# Patient Record
Sex: FEMALE | Race: WHITE | NOT HISPANIC OR LATINO | URBAN - METROPOLITAN AREA
[De-identification: names, ages, dates, MRNs, and addresses within clinical notes are randomized per-mention and may not be internally consistent; named-entity substitution may affect disease eponyms.]

---

## 2021-04-15 ENCOUNTER — OUTPATIENT (OUTPATIENT)
Dept: OUTPATIENT SERVICES | Facility: HOSPITAL | Age: 51
LOS: 1 days | End: 2021-04-15
Payer: COMMERCIAL

## 2021-04-15 DIAGNOSIS — K22.2 ESOPHAGEAL OBSTRUCTION: ICD-10-CM

## 2021-04-15 DIAGNOSIS — Z78.9 OTHER SPECIFIED HEALTH STATUS: ICD-10-CM

## 2021-04-15 DIAGNOSIS — R13.10 DYSPHAGIA, UNSPECIFIED: ICD-10-CM

## 2021-04-15 PROCEDURE — 74220 X-RAY XM ESOPHAGUS 1CNTRST: CPT | Mod: 26

## 2021-04-15 PROCEDURE — 74220 X-RAY XM ESOPHAGUS 1CNTRST: CPT

## 2021-07-23 ENCOUNTER — INPATIENT (INPATIENT)
Facility: HOSPITAL | Age: 51
LOS: 3 days | Discharge: HOME | DRG: 554 | End: 2021-07-27
Attending: HOSPITALIST | Admitting: INTERNAL MEDICINE
Payer: MEDICARE

## 2021-07-23 VITALS
OXYGEN SATURATION: 98 % | RESPIRATION RATE: 18 BRPM | TEMPERATURE: 99 F | HEART RATE: 97 BPM | DIASTOLIC BLOOD PRESSURE: 90 MMHG | SYSTOLIC BLOOD PRESSURE: 151 MMHG | WEIGHT: 210.1 LBS | HEIGHT: 66 IN

## 2021-07-23 DIAGNOSIS — Z96.653 PRESENCE OF ARTIFICIAL KNEE JOINT, BILATERAL: Chronic | ICD-10-CM

## 2021-07-23 DIAGNOSIS — M25.461 EFFUSION, RIGHT KNEE: ICD-10-CM

## 2021-07-23 DIAGNOSIS — Z90.711 ACQUIRED ABSENCE OF UTERUS WITH REMAINING CERVICAL STUMP: Chronic | ICD-10-CM

## 2021-07-23 DIAGNOSIS — Z98.891 HISTORY OF UTERINE SCAR FROM PREVIOUS SURGERY: Chronic | ICD-10-CM

## 2021-07-23 DIAGNOSIS — M25.561 PAIN IN RIGHT KNEE: ICD-10-CM

## 2021-07-23 LAB
ALBUMIN SERPL ELPH-MCNC: 3.7 G/DL — SIGNIFICANT CHANGE UP (ref 3.3–5)
ALP SERPL-CCNC: 100 U/L — SIGNIFICANT CHANGE UP (ref 40–120)
ALT FLD-CCNC: 20 U/L — SIGNIFICANT CHANGE UP (ref 10–45)
ANION GAP SERPL CALC-SCNC: 9 MMOL/L — SIGNIFICANT CHANGE UP (ref 5–17)
AST SERPL-CCNC: 22 U/L — SIGNIFICANT CHANGE UP (ref 10–40)
BASOPHILS # BLD AUTO: 0.1 K/UL — SIGNIFICANT CHANGE UP (ref 0–0.2)
BASOPHILS NFR BLD AUTO: 0.8 % — SIGNIFICANT CHANGE UP (ref 0–2)
BILIRUB SERPL-MCNC: 0.5 MG/DL — SIGNIFICANT CHANGE UP (ref 0.2–1.2)
BUN SERPL-MCNC: 15 MG/DL — SIGNIFICANT CHANGE UP (ref 7–23)
CALCIUM SERPL-MCNC: 9 MG/DL — SIGNIFICANT CHANGE UP (ref 8.4–10.5)
CHLORIDE SERPL-SCNC: 101 MMOL/L — SIGNIFICANT CHANGE UP (ref 96–108)
CK SERPL-CCNC: 91 U/L — SIGNIFICANT CHANGE UP (ref 25–170)
CO2 SERPL-SCNC: 26 MMOL/L — SIGNIFICANT CHANGE UP (ref 22–31)
CREAT SERPL-MCNC: 1.02 MG/DL — SIGNIFICANT CHANGE UP (ref 0.5–1.3)
EOSINOPHIL # BLD AUTO: 0.14 K/UL — SIGNIFICANT CHANGE UP (ref 0–0.5)
EOSINOPHIL NFR BLD AUTO: 1.1 % — SIGNIFICANT CHANGE UP (ref 0–6)
ERYTHROCYTE [SEDIMENTATION RATE] IN BLOOD: 15 MM/HR — SIGNIFICANT CHANGE UP (ref 0–20)
FLUAV AG NPH QL: SIGNIFICANT CHANGE UP
FLUBV AG NPH QL: SIGNIFICANT CHANGE UP
GLUCOSE SERPL-MCNC: 128 MG/DL — HIGH (ref 70–99)
HCT VFR BLD CALC: 42.8 % — SIGNIFICANT CHANGE UP (ref 34.5–45)
HGB BLD-MCNC: 15.2 G/DL — SIGNIFICANT CHANGE UP (ref 11.5–15.5)
IMM GRANULOCYTES NFR BLD AUTO: 0.4 % — SIGNIFICANT CHANGE UP (ref 0–1.5)
LYMPHOCYTES # BLD AUTO: 1.04 K/UL — SIGNIFICANT CHANGE UP (ref 1–3.3)
LYMPHOCYTES # BLD AUTO: 7.8 % — LOW (ref 13–44)
MCHC RBC-ENTMCNC: 30.8 PG — SIGNIFICANT CHANGE UP (ref 27–34)
MCHC RBC-ENTMCNC: 35.5 GM/DL — SIGNIFICANT CHANGE UP (ref 32–36)
MCV RBC AUTO: 86.8 FL — SIGNIFICANT CHANGE UP (ref 80–100)
MONOCYTES # BLD AUTO: 0.39 K/UL — SIGNIFICANT CHANGE UP (ref 0–0.9)
MONOCYTES NFR BLD AUTO: 2.9 % — SIGNIFICANT CHANGE UP (ref 2–14)
NEUTROPHILS # BLD AUTO: 11.56 K/UL — HIGH (ref 1.8–7.4)
NEUTROPHILS NFR BLD AUTO: 87 % — HIGH (ref 43–77)
NRBC # BLD: 0 /100 WBCS — SIGNIFICANT CHANGE UP (ref 0–0)
PLATELET # BLD AUTO: 282 K/UL — SIGNIFICANT CHANGE UP (ref 150–400)
POTASSIUM SERPL-MCNC: 3.4 MMOL/L — LOW (ref 3.5–5.3)
POTASSIUM SERPL-SCNC: 3.4 MMOL/L — LOW (ref 3.5–5.3)
PROT SERPL-MCNC: 7.2 G/DL — SIGNIFICANT CHANGE UP (ref 6–8.3)
RBC # BLD: 4.93 M/UL — SIGNIFICANT CHANGE UP (ref 3.8–5.2)
RBC # FLD: 12.1 % — SIGNIFICANT CHANGE UP (ref 10.3–14.5)
SARS-COV-2 RNA SPEC QL NAA+PROBE: SIGNIFICANT CHANGE UP
SODIUM SERPL-SCNC: 136 MMOL/L — SIGNIFICANT CHANGE UP (ref 135–145)
URATE SERPL-MCNC: 4.5 MG/DL — SIGNIFICANT CHANGE UP (ref 2.5–7)
WBC # BLD: 13.28 K/UL — HIGH (ref 3.8–10.5)
WBC # FLD AUTO: 13.28 K/UL — HIGH (ref 3.8–10.5)

## 2021-07-23 PROCEDURE — 99222 1ST HOSP IP/OBS MODERATE 55: CPT

## 2021-07-23 PROCEDURE — 20610 DRAIN/INJ JOINT/BURSA W/O US: CPT | Mod: RT

## 2021-07-23 PROCEDURE — 99285 EMERGENCY DEPT VISIT HI MDM: CPT

## 2021-07-23 PROCEDURE — 93970 EXTREMITY STUDY: CPT | Mod: 26

## 2021-07-23 PROCEDURE — 73562 X-RAY EXAM OF KNEE 3: CPT | Mod: 26,50

## 2021-07-23 PROCEDURE — 99222 1ST HOSP IP/OBS MODERATE 55: CPT | Mod: 25

## 2021-07-23 RX ORDER — POTASSIUM CHLORIDE 20 MEQ
20 PACKET (EA) ORAL
Refills: 0 | Status: COMPLETED | OUTPATIENT
Start: 2021-07-23 | End: 2021-07-23

## 2021-07-23 RX ORDER — OXYCODONE HYDROCHLORIDE 5 MG/1
5 TABLET ORAL EVERY 6 HOURS
Refills: 0 | Status: DISCONTINUED | OUTPATIENT
Start: 2021-07-23 | End: 2021-07-23

## 2021-07-23 RX ORDER — SENNA PLUS 8.6 MG/1
2 TABLET ORAL AT BEDTIME
Refills: 0 | Status: DISCONTINUED | OUTPATIENT
Start: 2021-07-23 | End: 2021-07-27

## 2021-07-23 RX ORDER — CEFUROXIME AXETIL 250 MG
500 TABLET ORAL EVERY 12 HOURS
Refills: 0 | Status: DISCONTINUED | OUTPATIENT
Start: 2021-07-23 | End: 2021-07-24

## 2021-07-23 RX ORDER — PANTOPRAZOLE SODIUM 20 MG/1
1 TABLET, DELAYED RELEASE ORAL
Qty: 0 | Refills: 0 | DISCHARGE

## 2021-07-23 RX ORDER — TRAMADOL HYDROCHLORIDE 50 MG/1
50 TABLET ORAL EVERY 6 HOURS
Refills: 0 | Status: DISCONTINUED | OUTPATIENT
Start: 2021-07-23 | End: 2021-07-27

## 2021-07-23 RX ORDER — OFLOXACIN 0.3 %
10 DROPS OPHTHALMIC (EYE) DAILY
Refills: 0 | Status: DISCONTINUED | OUTPATIENT
Start: 2021-07-23 | End: 2021-07-26

## 2021-07-23 RX ORDER — ATENOLOL 25 MG/1
1 TABLET ORAL
Qty: 0 | Refills: 0 | DISCHARGE

## 2021-07-23 RX ORDER — KETOROLAC TROMETHAMINE 30 MG/ML
15 SYRINGE (ML) INJECTION ONCE
Refills: 0 | Status: DISCONTINUED | OUTPATIENT
Start: 2021-07-23 | End: 2021-07-23

## 2021-07-23 RX ORDER — ENOXAPARIN SODIUM 100 MG/ML
40 INJECTION SUBCUTANEOUS DAILY
Refills: 0 | Status: DISCONTINUED | OUTPATIENT
Start: 2021-07-23 | End: 2021-07-27

## 2021-07-23 RX ORDER — AMLODIPINE BESYLATE 2.5 MG/1
1 TABLET ORAL
Qty: 0 | Refills: 0 | DISCHARGE

## 2021-07-23 RX ORDER — OXYCODONE AND ACETAMINOPHEN 5; 325 MG/1; MG/1
1 TABLET ORAL ONCE
Refills: 0 | Status: DISCONTINUED | OUTPATIENT
Start: 2021-07-23 | End: 2021-07-23

## 2021-07-23 RX ORDER — KETOROLAC TROMETHAMINE 30 MG/ML
30 SYRINGE (ML) INJECTION THREE TIMES A DAY
Refills: 0 | Status: DISCONTINUED | OUTPATIENT
Start: 2021-07-23 | End: 2021-07-25

## 2021-07-23 RX ORDER — AMLODIPINE BESYLATE 2.5 MG/1
10 TABLET ORAL DAILY
Refills: 0 | Status: DISCONTINUED | OUTPATIENT
Start: 2021-07-23 | End: 2021-07-27

## 2021-07-23 RX ORDER — HYDROCHLOROTHIAZIDE 25 MG
25 TABLET ORAL DAILY
Refills: 0 | Status: DISCONTINUED | OUTPATIENT
Start: 2021-07-23 | End: 2021-07-27

## 2021-07-23 RX ORDER — ATORVASTATIN CALCIUM 80 MG/1
1 TABLET, FILM COATED ORAL
Qty: 0 | Refills: 0 | DISCHARGE

## 2021-07-23 RX ORDER — PANTOPRAZOLE SODIUM 20 MG/1
40 TABLET, DELAYED RELEASE ORAL
Refills: 0 | Status: DISCONTINUED | OUTPATIENT
Start: 2021-07-23 | End: 2021-07-27

## 2021-07-23 RX ORDER — ATENOLOL 25 MG/1
50 TABLET ORAL DAILY
Refills: 0 | Status: DISCONTINUED | OUTPATIENT
Start: 2021-07-23 | End: 2021-07-27

## 2021-07-23 RX ORDER — ACETAMINOPHEN 500 MG
650 TABLET ORAL EVERY 6 HOURS
Refills: 0 | Status: DISCONTINUED | OUTPATIENT
Start: 2021-07-23 | End: 2021-07-27

## 2021-07-23 RX ORDER — CLARITHROMYCIN 500 MG
500 TABLET ORAL
Refills: 0 | Status: DISCONTINUED | OUTPATIENT
Start: 2021-07-23 | End: 2021-07-23

## 2021-07-23 RX ORDER — DEXAMETHASONE 0.5 MG/5ML
10 ELIXIR ORAL ONCE
Refills: 0 | Status: COMPLETED | OUTPATIENT
Start: 2021-07-23 | End: 2021-07-23

## 2021-07-23 RX ORDER — OXYCODONE AND ACETAMINOPHEN 5; 325 MG/1; MG/1
1 TABLET ORAL EVERY 6 HOURS
Refills: 0 | Status: DISCONTINUED | OUTPATIENT
Start: 2021-07-23 | End: 2021-07-27

## 2021-07-23 RX ORDER — DIAZEPAM 5 MG
5 TABLET ORAL ONCE
Refills: 0 | Status: DISCONTINUED | OUTPATIENT
Start: 2021-07-23 | End: 2021-07-23

## 2021-07-23 RX ADMIN — Medication 30 MILLIGRAM(S): at 12:45

## 2021-07-23 RX ADMIN — Medication 20 MILLIEQUIVALENT(S): at 15:28

## 2021-07-23 RX ADMIN — Medication 25 MILLIGRAM(S): at 06:41

## 2021-07-23 RX ADMIN — OXYCODONE HYDROCHLORIDE 5 MILLIGRAM(S): 5 TABLET ORAL at 08:32

## 2021-07-23 RX ADMIN — OXYCODONE AND ACETAMINOPHEN 1 TABLET(S): 5; 325 TABLET ORAL at 18:08

## 2021-07-23 RX ADMIN — AMLODIPINE BESYLATE 10 MILLIGRAM(S): 2.5 TABLET ORAL at 06:41

## 2021-07-23 RX ADMIN — Medication 10 DROP(S): at 12:34

## 2021-07-23 RX ADMIN — Medication 5 MILLIGRAM(S): at 02:51

## 2021-07-23 RX ADMIN — OXYCODONE AND ACETAMINOPHEN 1 TABLET(S): 5; 325 TABLET ORAL at 18:51

## 2021-07-23 RX ADMIN — Medication 500 MILLIGRAM(S): at 12:31

## 2021-07-23 RX ADMIN — Medication 16 MILLIGRAM(S): at 06:41

## 2021-07-23 RX ADMIN — Medication 10 MILLIGRAM(S): at 05:00

## 2021-07-23 RX ADMIN — Medication 15 MILLIGRAM(S): at 02:51

## 2021-07-23 RX ADMIN — Medication 30 MILLIGRAM(S): at 21:35

## 2021-07-23 RX ADMIN — PANTOPRAZOLE SODIUM 40 MILLIGRAM(S): 20 TABLET, DELAYED RELEASE ORAL at 06:41

## 2021-07-23 RX ADMIN — Medication 20 MILLIEQUIVALENT(S): at 12:32

## 2021-07-23 RX ADMIN — ENOXAPARIN SODIUM 40 MILLIGRAM(S): 100 INJECTION SUBCUTANEOUS at 12:32

## 2021-07-23 RX ADMIN — Medication 15 MILLIGRAM(S): at 03:21

## 2021-07-23 RX ADMIN — OXYCODONE AND ACETAMINOPHEN 1 TABLET(S): 5; 325 TABLET ORAL at 04:06

## 2021-07-23 RX ADMIN — Medication 102 MILLIGRAM(S): at 04:28

## 2021-07-23 RX ADMIN — TRAMADOL HYDROCHLORIDE 50 MILLIGRAM(S): 50 TABLET ORAL at 18:00

## 2021-07-23 RX ADMIN — SENNA PLUS 2 TABLET(S): 8.6 TABLET ORAL at 20:35

## 2021-07-23 RX ADMIN — ATENOLOL 50 MILLIGRAM(S): 25 TABLET ORAL at 06:40

## 2021-07-23 RX ADMIN — OXYCODONE AND ACETAMINOPHEN 1 TABLET(S): 5; 325 TABLET ORAL at 03:36

## 2021-07-23 RX ADMIN — Medication 500 MILLIGRAM(S): at 17:18

## 2021-07-23 RX ADMIN — PANTOPRAZOLE SODIUM 40 MILLIGRAM(S): 20 TABLET, DELAYED RELEASE ORAL at 18:04

## 2021-07-23 RX ADMIN — Medication 16 MILLIGRAM(S): at 18:04

## 2021-07-23 RX ADMIN — TRAMADOL HYDROCHLORIDE 50 MILLIGRAM(S): 50 TABLET ORAL at 17:16

## 2021-07-23 RX ADMIN — Medication 30 MILLIGRAM(S): at 12:35

## 2021-07-23 RX ADMIN — OXYCODONE HYDROCHLORIDE 5 MILLIGRAM(S): 5 TABLET ORAL at 09:30

## 2021-07-23 RX ADMIN — Medication 30 MILLIGRAM(S): at 20:35

## 2021-07-23 NOTE — ED PROVIDER NOTE - NS ED ROS FT
ROS:  GEN: (-) fevers/chills, (-) weight loss, (-) fatigue/malaise  HEENT: (-) visual change  NECK: (-) stiffness, (-) swelling  RESP: (-) shortness of breath, (-) cough, (-) sputum  CV: (-) chest pain, (-) palpitations, (-) PND/orthopnea  GI: (-) nausea, (-) vomiting, (-) pain, (-) constipation, (-) diarrhea  : (-) frequency/urgency, (-) hematuria, (-) dysuria, (-) incontinence, (-) discharge  EXT: (-) edema, (-) cyanosis  ENDO: (-) heat/cold intolerance, (-) polyuria  NEURO: (-) paresthesias, (-) weakness, (-) headache, (-) dizziness, (-) syncope  MSK: (+) knee pain  SKIN: (-) rash

## 2021-07-23 NOTE — H&P ADULT - NSICDXFAMILYHX_GEN_ALL_CORE_FT
FAMILY HISTORY:  Mother  Still living? Unknown  FH: thyroid disease, Age at diagnosis: Age Unknown    Sibling  Still living? Unknown  FH: thyroid disease, Age at diagnosis: Age Unknown

## 2021-07-23 NOTE — H&P ADULT - NSHPPHYSICALEXAM_GEN_ALL_CORE
Vital Signs Last 24 Hrs  T(C): 37 (23 Jul 2021 06:26), Max: 37.4 (23 Jul 2021 02:37)  T(F): 98.6 (23 Jul 2021 06:26), Max: 99.3 (23 Jul 2021 02:37)  HR: 68 (23 Jul 2021 06:26) (68 - 97)  BP: 114/75 (23 Jul 2021 06:26) (114/75 - 151/90)  BP(mean): --  RR: 18 (23 Jul 2021 06:26) (18 - 18)  SpO2: 95% (23 Jul 2021 06:26) (95% - 98%)  Daily Height in cm: 167.64 (23 Jul 2021 02:37)    Daily   CAPILLARY BLOOD GLUCOSE        I&O's Summary      GENERAL: NAD  HEAD:  Normocephalic  EYES: EOMI, PERRLA, conjunctiva and sclera clear  ENMT: No tonsillar erythema, exudates, or enlargement; Moist mucous membranes, No lesions  NECK: Supple, No JVD, no bruit, normal thyroid  NERVOUS SYSTEM:  Alert & Oriented X3, grossly moves all fours.   CHEST/LUNG: Clear to auscultation bilaterally; No rales, rhonchi, wheezing, or rubs  HEART: Regular rate and rhythm; +systolic murmurs, no rubs, or gallops  ABDOMEN: Soft, Nontender, Nondistended; Bowel sounds present  EXTREMITIES:  2+ Peripheral Pulses, No clubbing, cyanosis, or edema. bl TKR with bl knee effusions R>>L with bl posterior knee fullness R >>L painful with slight warmth but no erythema.   LYMPH: No lymphadenopathy noted  SKIN: No rashes or lesions

## 2021-07-23 NOTE — CONSULT NOTE ADULT - SUBJECTIVE AND OBJECTIVE BOX
GEOVANNY KIM      51y Female with hx of HTN, HLD, s/p b/l TKR  p/w L ear pain of about 2 days duration and had seen Urgent care 2 days ago and prescribed Biaxin 500mg bid and had 3 doses and ofloxacin ear drops was added yesterday and later developed acute onset of b/l knee pain with swelling and unable to bend the knee or bear weight secondary to significant pain and swelling. Denied any other peripheral joint involvement or axial sxs. Denied trauma. Denied any fevers, chills, cough, recent diarrheal illness, oral ulcers, eye inflammation, dysuria. No hx of crystal arthropathies, uric acid nephrolithiasis, autoimmune arthritis, psoriasis.                                                                                                                               PAST MEDICAL HISTORY:  Gastroesophageal reflux disease     Hyperlipidemia     Hypertension.     PAST SURGICAL HISTORY:  H/O:  x4    History of partial hysterectomy     History of total knee replacement, bilateral in .         T(F): 97.8  HR: 62  BP: 122/63  RR: 18  SpO2: 96%                        15.2   13.28 )-----------( 282      ( 2021 02:39 )             42.8                     07-23    136  |  101  |  15  ----------------------------<  128<H>  3.4<L>   |  26  |  1.02    Ca    9.0      2021 02:39    TPro  7.2  /  Alb  3.7  /  TBili  0.5  /  DBili  x   /  AST  22  /  ALT  20  /  AlkPhos  100  07-    ESR 15    Serum Uric Acid 4.5      Physical Exam :    -   Right knee with skin C/D/I, well healed surgical scar, 2-3+ effusion, marked tenderness to palpation, no erythema, no warmth to touch, ROM 0-50 degrees with pain, no instability.   -   Distal Neurvascular status intact grossly.   -   Warm well perfused; capillary refill <3 seconds   -   (+)EHL/FHL 5/5  -   (+) Sensation to light touch  -   Left knee with skin C/D/I, well healed surgical scar, 1-2+ effusion, moderate tenderness to palpation, no erythema, no warmth to touch, ROM 0-100 degrees with mild pain, no instability.   -   Distal Neurvascular status intact grossly.   -   Warm well perfused; capillary refill <3 seconds   -   (+)EHL/FHL 5/5  -   (+) Sensation to light touch  -   (-) Calf tenderness Bilaterally      AP and lateral x-rays of B/L knees show s/p TKR with prosthesis in good alignment, no evidence of loosening or acute fracture.      EXAM:  US DPLX LWR EXT VEINS COMPL BI      PROCEDURE DATE:  2021        INTERPRETATION:  CLINICAL INFORMATION: Bilateral leg and knee pain. Evaluate for DVT. History of bilateral knee replacements.    COMPARISON: None available.    TECHNIQUE: Duplex sonography of the BILATERAL LOWER extremity veins with color and spectral Doppler, with and without compression.    FINDINGS:    RIGHT:  Normal compressibility of the RIGHT common femoral, femoral and popliteal veins.  Doppler examination shows normal spontaneous and phasic flow.  No RIGHT calf vein thrombosis is detected.  5.0 x 3.5 x 2.3 cm as well as a 3.8 x 3.0 x 2.0 cm complex fluid collections are identified within the right popliteal fossa, possible Baker's cyst versus postsurgical change.  Right suprapatellar joint effusion identified.    LEFT:  Normal compressibility of the LEFT common femoral, femoral and popliteal veins.  Doppler examination shows normal spontaneous and phasic flow.  No LEFT calf vein thrombosis is detected.  A 3.9 x 2.8 x 2.4 cm complicated fluid collection is identified within the left popliteal fossa, possible Baker's cyst versus postsurgical change.  Left suprapatellar joint effusion is identified.    IMPRESSION:  No evidence of deep venous thrombosis in either lower extremity. See above discussion.          --- End of Report ---              DANIELITO WOMACK MD; Attending Radiologist  This document has been electronically signed. 2021  3:53PM

## 2021-07-23 NOTE — H&P ADULT - NSHPLABSRESULTS_GEN_ALL_CORE
15.2   13.28 )-----------( 282      ( 23 Jul 2021 02:39 )             42.8       07-23    136  |  101  |  15  ----------------------------<  128<H>  3.4<L>   |  26  |  1.02    Ca    9.0      23 Jul 2021 02:39    TPro  7.2  /  Alb  3.7  /  TBili  0.5  /  DBili  x   /  AST  22  /  ALT  20  /  AlkPhos  100  07-23         LIVER FUNCTIONS - ( 23 Jul 2021 02:39 )  Alb: 3.7 g/dL / Pro: 7.2 g/dL / ALK PHOS: 100 U/L / ALT: 20 U/L / AST: 22 U/L / GGT: x                   CARDIAC MARKERS ( 23 Jul 2021 04:30 )  x     / x     / 91 U/L / x     / x                CAPILLARY BLOOD GLUCOSE            EKG: PENDING      bl knee Xrays: wet read. personally reviewed. + effusions bl

## 2021-07-23 NOTE — ED PROVIDER NOTE - PHYSICAL EXAMINATION
PHYSICAL EXAMINATION:  VITALS REVIEWED.  VS hypertensive, otherwise normal  GENERALIZED APPEARANCE:  Uncomfortable appearing female lying in bed  SKIN:  Warm, dry, no cyanosis  HEAD:  No obvious scalp lesions  EYES:  Conjunctiva pink, no icterus  ENMT:  Mucus membranes moist, no stridor  NECK:  Supple, non-tender  CHEST AND RESPIRATORY:  Clear to auscultation B/L, good air entry B/L, equal chest expansion  HEART AND CARDIOVASCULAR:  Regular rate, no obvious murmur  ABDOMEN AND GI:  Soft, non-tender, non-distended.  No rebound, no guarding, no CVA-area tenderness  EXTREMITIES:  No deformity, edema, or calf tenderness; bilateral knees with well healed old scars; ROM of knees actively limited 2/2 pain, passively able to flex/extend, negative valgus/varus tests; DP/PT pulses 2+ B/L, SILT, compartments soft, ROM of ankles/hips intact, pop pulse 2+  NEURO: AAOx3, gross motor and sensory intact PHYSICAL EXAMINATION:  VITALS REVIEWED.  VS hypertensive, otherwise normal  GENERALIZED APPEARANCE:  Uncomfortable appearing female lying in bed  SKIN:  Warm, dry, no cyanosis  HEAD:  No obvious scalp lesions  EYES:  Conjunctiva pink, no icterus  ENMT:  Mucus membranes moist, no stridor  NECK:  Supple, non-tender  CHEST AND RESPIRATORY:  Clear to auscultation B/L, good air entry B/L, equal chest expansion  HEART AND CARDIOVASCULAR:  Regular rate, no obvious murmur  ABDOMEN AND GI:  Soft, non-tender, non-distended.  No rebound, no guarding, no CVA-area tenderness  EXTREMITIES:  No deformity, edema, or calf tenderness; no venous tenderness, bilateral knees with well healed old scars; ROM of knees actively limited 2/2 pain, passively able to flex/extend, negative valgus/varus tests; DP/PT pulses 2+ B/L, SILT, compartments soft, ROM of ankles/hips intact, pop pulse 2+  NEURO: AAOx3, gross motor and sensory intact

## 2021-07-23 NOTE — H&P ADULT - ASSESSMENT
51F hx of HTN, HLD, s/p bl TKR  pw L ear pain on biaxin with acute onset of bl knee pain.     # Bl knee pain with effusions in bl replaced knees.   r/o crystal arthropathies, check uric acid, TSH, PTH. less likely reactive arthritis/autoimmune arthritis.  trial of steroids.   ortho consult   check dopplers to be complete.     #HTN  cont amlodipine, atenolol.  cont hctz for now.     #DVT/GI proph    CAPRINI SCORE [CLOT]    AGE RELATED RISK FACTORS                                                       MOBILITY RELATED FACTORS  [1 ] Age 41-60 years                                            (1 Point)                  [1 ] Bed rest                                                        (1 Point)  [ ] Age: 61-74 years                                           (2 Points)                 [ ] Plaster cast                                                   (2 Points)  [ ] Age= 75 years                                              (3 Points)                 [ ] Bed bound for more than 72 hours                 (2 Points)    DISEASE RELATED RISK FACTORS                                               GENDER SPECIFIC FACTORS  [ ] Edema in the lower extremities                       (1 Point)                  [ ] Pregnancy                                                     (1 Point)  [ ] Varicose veins                                               (1 Point)                  [ ] Post-partum < 6 weeks                                   (1 Point)             [1 ] BMI > 25 Kg/m2                                            (1 Point)                  [ ] Hormonal therapy  or oral contraception          (1 Point)                 [ ] Sepsis (in the previous month)                        (1 Point)                  [ ] History of pregnancy complications                 (1 point)  [ ] Pneumonia or serious lung disease                                               [ ] Unexplained or recurrent                     (1 Point)           (in the previous month)                               (1 Point)  [ ] Abnormal pulmonary function test                     (1 Point)                 SURGERY RELATED RISK FACTORS  [ ] Acute myocardial infarction                              (1 Point)                 [ ]  Section                                             (1 Point)  [ ] Congestive heart failure (in the previous month)  (1 Point)               [ ] Minor surgery                                                  (1 Point)   [ ] Inflammatory bowel disease                             (1 Point)                 [ ] Arthroscopic surgery                                        (2 Points)  [ ] Central venous access                                      (2 Points)                [ ] General surgery lasting more than 45 minutes   (2 Points)       [ ] Stroke (in the previous month)                          (5 Points)               [ ] Elective arthroplasty                                         (5 Points)                                                                                                                                               HEMATOLOGY RELATED FACTORS                                                 TRAUMA RELATED RISK FACTORS  [ ] Prior episodes of VTE                                     (3 Points)                [ ] Fracture of the hip, pelvis, or leg                       (5 Points)  [ ] Positive family history for VTE                         (3 Points)                 [ ] Acute spinal cord injury (in the previous month)  (5 Points)  [ ] Prothrombin 32765 A                                     (3 Points)                 [ ] Paralysis  (less than 1 month)                             (5 Points)  [ ] Factor V Leiden                                             (3 Points)                  [ ] Multiple Trauma within 1 month                        (5 Points)  [ ] Lupus anticoagulants                                     (3 Points)                                                           [ ] Anticardiolipin antibodies                               (3 Points)                                                       [ ] High homocysteine in the blood                      (3 Points)                                             [ ] Other congenital or acquired thrombophilia      (3 Points)                                                [ ] Heparin induced thrombocytopenia                  (3 Points)                                          Total Score [     3     ]    Caprini Score 0 - 2:  Low Risk, No VTE Prophylaxis required for most patients, encourage ambulation  Caprini Score 3 - 6:  At Risk, pharmacologic VTE prophylaxis is indicated for most patients (in the absence of a contraindication)  Caprini Score Greater than or = 7:  High Risk, pharmacologic VTE prophylaxis is indicated for most patients (in the absence of a contraindication)

## 2021-07-23 NOTE — ED ADULT TRIAGE NOTE - CHIEF COMPLAINT QUOTE
c/o bilateral leg and knee pain that has not let her sleep tonight. took 800mg motrin earlier with no relief.

## 2021-07-23 NOTE — H&P ADULT - NSHPREVIEWOFSYSTEMS_GEN_ALL_CORE
CONSTITUTIONAL: No fever, weight loss, or fatigue  EYES: No eye pain, visual disturbances, or discharge  ENMT:  No difficulty hearing, tinnitus, vertigo; No sinus or throat pain  NECK: No pain or stiffness  RESPIRATORY: No cough, wheezing, chills or hemoptysis; No shortness of breath  CARDIOVASCULAR: No chest pain, palpitations, dizziness, or leg swelling  GASTROINTESTINAL: No abdominal or epigastric pain. No nausea, vomiting, or hematemesis; No diarrhea or constipation. No melena or hematochezia.  GENITOURINARY: No dysuria, frequency, hematuria, or incontinence  NEUROLOGICAL: No headaches, memory loss, loss of strength, numbness, or tremors  SKIN: No itching, burning, rashes, or lesions   LYMPH NODES: No enlarged glands  ENDOCRINE: No heat or cold intolerance; No hair loss  MUSCULOSKELETAL: No joint pain or swelling; No muscle, back, or extremity pain. ++bl knee pain as per HPI  PSYCHIATRIC: No depression, anxiety, mood swings, or difficulty sleeping  HEME/LYMPH: No easy bruising, or bleeding gums  ALLERY AND IMMUNOLOGIC: No hives or eczema

## 2021-07-23 NOTE — ED PROVIDER NOTE - CLINICAL SUMMARY MEDICAL DECISION MAKING FREE TEXT BOX
51F w/ vague non-specific bilateral knee pain, sharp, ?MSK pain; no acute red flags, VS reassuring, will treat symptomatically, monitor, re-eval

## 2021-07-23 NOTE — PATIENT PROFILE ADULT - VISION (WITH CORRECTIVE LENSES IF THE PATIENT USUALLY WEARS THEM):
patient has reading glasses/Normal vision: sees adequately in most situations; can see medication labels, newsprint

## 2021-07-23 NOTE — H&P ADULT - HISTORY OF PRESENT ILLNESS
51F hx of HTN, HLD, s/p bl TKR 2006 pw L ear pain of about 2 days duration and had seen Urgent care 2 days ago and prescribed Biaxin 500mg bid and had 3 doses and ofloxacin ear drops was added yesterday and later developed acute onset of bl knee pain with swelling and unable to bend the knee sec to significant pain and swelling. Denied any other peripheral joint involvement or axial sxs. Denied trauma. Denied any fevers, chills, cough, recent diarrheal illness, oral ulcers, eye inflammation, dysuria. No hx of crystal arthropathies, uric acid nephrolithiasis, autoimmune arthritis, psoriasis. In ED afebrile P: 97 BP: 151/90 sat 95-98% on RA. WBC: 13.3 K: 3.4 ESR Pending. s/p Decadron, ketorolac, Percocet in ED with still sig pain and unable to ambulate.

## 2021-07-23 NOTE — ED PROVIDER NOTE - CARE PLAN
Principal Discharge DX:	Bilateral knee pain   Principal Discharge DX:	Bilateral knee pain  Secondary Diagnosis:	Unable to ambulate

## 2021-07-23 NOTE — H&P ADULT - NSICDXPASTSURGICALHX_GEN_ALL_CORE_FT
PAST SURGICAL HISTORY:  H/O:  x4    History of partial hysterectomy     History of total knee replacement, bilateral

## 2021-07-23 NOTE — ED PROVIDER NOTE - PROGRESS NOTE DETAILS
Patient continues to report moderate pain of bilateral knees, was able to use the commode with assistance, ?unknown etiology of bilateral knee pain but symmetrical, likely MSK. Will attempt steroids, continue to monitor. Patient continues to report moderate pain of bilateral knees, was able to use the commode with assistance, ?unknown etiology of bilateral knee pain but symmetrical, ?inflammatory arthritis, likely MSK. Will attempt steroids, continue to monitor.

## 2021-07-23 NOTE — ED PROVIDER NOTE - OBJECTIVE STATEMENT
51F w/ PMHx of HTN, HLD presenting to the ED complaining of bilateral knee pain, states that today she went to an urgent care where they diagnosed where with a left ear infection and gave her Clarithromycin and Ofloxacin for her ear infection, went home and started having moderate bilateral knee pain, unable to sleep or ambulate, states that she does not know why, took 800mg of Ibuprofen without relief, has not had pain like this before. Denies any numbness/tingling. Denies any falls/trauma. No recent immobilization or surgeries. Had bilateral knee replacements many years ago. 51F w/ PMHx of HTN, HLD presenting to the ED complaining of bilateral knee pain, states that today she went to an urgent care where they diagnosed where with a left ear infection and gave her Clarithromycin and Ofloxacin for her ear infection, went home and started having moderate bilateral knee pain, unable to sleep or ambulate, states that she does not know why, took 800mg of Ibuprofen without relief, has not had pain like this before. Denies any numbness/tingling. Denies any falls/trauma. No recent immobilization or surgeries. Had bilateral knee replacements many years ago. States memorial day patient had leg swelling bilaterally "blew up like balloons" went to an urgent care and was told it was dependent edema, that has resolved.

## 2021-07-24 LAB
ALBUMIN SERPL ELPH-MCNC: 3.5 G/DL — SIGNIFICANT CHANGE UP (ref 3.3–5)
ALP SERPL-CCNC: 92 U/L — SIGNIFICANT CHANGE UP (ref 40–120)
ALT FLD-CCNC: 21 U/L — SIGNIFICANT CHANGE UP (ref 10–45)
ANION GAP SERPL CALC-SCNC: 9 MMOL/L — SIGNIFICANT CHANGE UP (ref 5–17)
AST SERPL-CCNC: 12 U/L — SIGNIFICANT CHANGE UP (ref 10–40)
B PERT IGG+IGM PNL SER: ABNORMAL
BASOPHILS # BLD AUTO: 0.04 K/UL — SIGNIFICANT CHANGE UP (ref 0–0.2)
BASOPHILS NFR BLD AUTO: 0.2 % — SIGNIFICANT CHANGE UP (ref 0–2)
BILIRUB SERPL-MCNC: 0.3 MG/DL — SIGNIFICANT CHANGE UP (ref 0.2–1.2)
BUN SERPL-MCNC: 29 MG/DL — HIGH (ref 7–23)
CALCIUM SERPL-MCNC: 9.7 MG/DL — SIGNIFICANT CHANGE UP (ref 8.4–10.5)
CHLORIDE SERPL-SCNC: 100 MMOL/L — SIGNIFICANT CHANGE UP (ref 96–108)
CO2 SERPL-SCNC: 27 MMOL/L — SIGNIFICANT CHANGE UP (ref 22–31)
COLOR FLD: YELLOW — SIGNIFICANT CHANGE UP
COVID-19 SPIKE DOMAIN AB INTERP: POSITIVE
COVID-19 SPIKE DOMAIN ANTIBODY RESULT: >250 U/ML — HIGH
CREAT SERPL-MCNC: 1.02 MG/DL — SIGNIFICANT CHANGE UP (ref 0.5–1.3)
EOSINOPHIL # BLD AUTO: 0.12 K/UL — SIGNIFICANT CHANGE UP (ref 0–0.5)
EOSINOPHIL # FLD: 0 % — SIGNIFICANT CHANGE UP
EOSINOPHIL NFR BLD AUTO: 0.6 % — SIGNIFICANT CHANGE UP (ref 0–6)
FLUID INTAKE SUBSTANCE CLASS: SIGNIFICANT CHANGE UP
FLUID SEGMENTED GRANULOCYTES: 76 % — SIGNIFICANT CHANGE UP
FOLATE+VIT B12 SERBLD-IMP: 0 % — SIGNIFICANT CHANGE UP
GLUCOSE SERPL-MCNC: 148 MG/DL — HIGH (ref 70–99)
GRAM STN FLD: SIGNIFICANT CHANGE UP
HCT VFR BLD CALC: 42.2 % — SIGNIFICANT CHANGE UP (ref 34.5–45)
HGB BLD-MCNC: 14.8 G/DL — SIGNIFICANT CHANGE UP (ref 11.5–15.5)
IMM GRANULOCYTES NFR BLD AUTO: 0.8 % — SIGNIFICANT CHANGE UP (ref 0–1.5)
LYMPHOCYTES # BLD AUTO: 0.73 K/UL — LOW (ref 1–3.3)
LYMPHOCYTES # BLD AUTO: 3.6 % — LOW (ref 13–44)
LYMPHOCYTES # FLD: 2 % — SIGNIFICANT CHANGE UP
MCHC RBC-ENTMCNC: 30.3 PG — SIGNIFICANT CHANGE UP (ref 27–34)
MCHC RBC-ENTMCNC: 35.1 GM/DL — SIGNIFICANT CHANGE UP (ref 32–36)
MCV RBC AUTO: 86.5 FL — SIGNIFICANT CHANGE UP (ref 80–100)
MESOTHL CELL # FLD: 0 % — SIGNIFICANT CHANGE UP
MONOCYTES # BLD AUTO: 0.42 K/UL — SIGNIFICANT CHANGE UP (ref 0–0.9)
MONOCYTES NFR BLD AUTO: 2.1 % — SIGNIFICANT CHANGE UP (ref 2–14)
MONOS+MACROS # FLD: 22 % — SIGNIFICANT CHANGE UP
NEUTROPHILS # BLD AUTO: 18.95 K/UL — HIGH (ref 1.8–7.4)
NEUTROPHILS NFR BLD AUTO: 92.7 % — HIGH (ref 43–77)
NRBC # BLD: 0 /100 WBCS — SIGNIFICANT CHANGE UP (ref 0–0)
NRBC # FLD: 0 — SIGNIFICANT CHANGE UP
OTHER CELLS FLD MANUAL: 0 % — SIGNIFICANT CHANGE UP
PLATELET # BLD AUTO: 293 K/UL — SIGNIFICANT CHANGE UP (ref 150–400)
POTASSIUM SERPL-MCNC: 3.6 MMOL/L — SIGNIFICANT CHANGE UP (ref 3.5–5.3)
POTASSIUM SERPL-SCNC: 3.6 MMOL/L — SIGNIFICANT CHANGE UP (ref 3.5–5.3)
PROCALCITONIN SERPL-MCNC: 0.03 NG/ML — SIGNIFICANT CHANGE UP
PROT SERPL-MCNC: 7.5 G/DL — SIGNIFICANT CHANGE UP (ref 6–8.3)
RBC # BLD: 4.88 M/UL — SIGNIFICANT CHANGE UP (ref 3.8–5.2)
RBC # FLD: 12.2 % — SIGNIFICANT CHANGE UP (ref 10.3–14.5)
RCV VOL RI: HIGH /UL (ref 0–0)
SARS-COV-2 IGG+IGM SERPL QL IA: >250 U/ML — HIGH
SARS-COV-2 IGG+IGM SERPL QL IA: POSITIVE
SODIUM SERPL-SCNC: 136 MMOL/L — SIGNIFICANT CHANGE UP (ref 135–145)
SPECIMEN SOURCE: SIGNIFICANT CHANGE UP
SYNOVIAL CRYSTALS CLARITY: ABNORMAL
SYNOVIAL CRYSTALS COLOR: YELLOW
SYNOVIAL CRYSTALS ID: SIGNIFICANT CHANGE UP
SYNOVIAL CRYSTALS TUBE: SIGNIFICANT CHANGE UP
TOTAL NUCLEATED CELL COUNT, BODY FLUID: SIGNIFICANT CHANGE UP /UL
TUBE TYPE: SIGNIFICANT CHANGE UP
WBC # BLD: 20.43 K/UL — HIGH (ref 3.8–10.5)
WBC # FLD AUTO: 20.43 K/UL — HIGH (ref 3.8–10.5)

## 2021-07-24 PROCEDURE — 99232 SBSQ HOSP IP/OBS MODERATE 35: CPT

## 2021-07-24 RX ADMIN — Medication 16 MILLIGRAM(S): at 05:30

## 2021-07-24 RX ADMIN — ENOXAPARIN SODIUM 40 MILLIGRAM(S): 100 INJECTION SUBCUTANEOUS at 11:24

## 2021-07-24 RX ADMIN — Medication 25 MILLIGRAM(S): at 05:55

## 2021-07-24 RX ADMIN — ATENOLOL 50 MILLIGRAM(S): 25 TABLET ORAL at 05:30

## 2021-07-24 RX ADMIN — OXYCODONE AND ACETAMINOPHEN 1 TABLET(S): 5; 325 TABLET ORAL at 15:45

## 2021-07-24 RX ADMIN — Medication 500 MILLIGRAM(S): at 05:55

## 2021-07-24 RX ADMIN — PANTOPRAZOLE SODIUM 40 MILLIGRAM(S): 20 TABLET, DELAYED RELEASE ORAL at 05:30

## 2021-07-24 RX ADMIN — OXYCODONE AND ACETAMINOPHEN 1 TABLET(S): 5; 325 TABLET ORAL at 06:42

## 2021-07-24 RX ADMIN — Medication 30 MILLIGRAM(S): at 06:11

## 2021-07-24 RX ADMIN — AMLODIPINE BESYLATE 10 MILLIGRAM(S): 2.5 TABLET ORAL at 05:30

## 2021-07-24 RX ADMIN — SENNA PLUS 2 TABLET(S): 8.6 TABLET ORAL at 21:08

## 2021-07-24 RX ADMIN — Medication 30 MILLIGRAM(S): at 22:00

## 2021-07-24 RX ADMIN — OXYCODONE AND ACETAMINOPHEN 1 TABLET(S): 5; 325 TABLET ORAL at 14:47

## 2021-07-24 RX ADMIN — Medication 30 MILLIGRAM(S): at 05:30

## 2021-07-24 RX ADMIN — Medication 30 MILLIGRAM(S): at 13:14

## 2021-07-24 RX ADMIN — PANTOPRAZOLE SODIUM 40 MILLIGRAM(S): 20 TABLET, DELAYED RELEASE ORAL at 17:04

## 2021-07-24 RX ADMIN — Medication 30 MILLIGRAM(S): at 21:08

## 2021-07-24 RX ADMIN — Medication 10 DROP(S): at 11:24

## 2021-07-24 RX ADMIN — OXYCODONE AND ACETAMINOPHEN 1 TABLET(S): 5; 325 TABLET ORAL at 05:55

## 2021-07-24 NOTE — PROGRESS NOTE ADULT - ASSESSMENT
51 year old female with hx of htn , OA, with c/o bilateral knee pain and swelling causing her to be unable to walk without pain 51 year old female with hx of htn , OA,presented to ER  c/o bilateral knee pain and swelling causing her to be unable to walk. Patient also presented   with hx of left ear infection.  Patient had bilateral Total knee Replacements in 2007.   Patient seen by Dr Olsen yesterday and a left knee   aspirate sent for cell count, culture, gram stain, crystals.      --Elevated Total nucleated cell count 46,810  --Elevated WBC 20  --Improved clinically   --Afebrile     Plan:  Discussed with Dr Olsen, Dr Cruz, Dr Walton ID           check labs in am,              check for blood culture results and synovial fluid results           All antibiotics discontinued  as per ID           Follow clinically            Continue to R/O Septic knee 51 year old female with hx of htn , OA,presented to ER  c/o bilateral knee pain and swelling causing her to be unable to walk. Patient also presented   with hx of left ear infection.  Patient had bilateral Total knee Replacements in 2007.   Patient seen by Dr Olsen yesterday and a right knee   aspirate sent for cell count, culture, gram stain, crystals.      --Elevated Total nucleated cell count 46,810  --Elevated WBC 20  --Improved clinically   --Afebrile     Plan:  Discussed with Dr Olsen, Dr Cruz, Dr Walton ID           check labs in am,              check for blood culture results and synovial fluid results           All antibiotics discontinued  as per ID           Follow clinically            Continue to R/O Septic knee

## 2021-07-24 NOTE — PHYSICAL THERAPY INITIAL EVALUATION ADULT - NS ASR RISK AREAS PT EVAL
Please notify of results  
fall

## 2021-07-24 NOTE — CONSULT NOTE ADULT - ASSESSMENT
51y past medical history of HTN, HLD, s/p bl TKR 2006 pw L ear pain of about 2 days duration and had seen Urgent care 2 days ago and prescribed Biaxin 500mg bid and had 3 doses and ofloxacin ear drops was added yesterday and later developed acute onset of bilateral knee pain with swelling and unable to bend the right  knee secondary to significant pain and swelling. Denied trauma. Denied any fevers, chills, cough. The patient admitted to [facility] she was seen by ortho and she underwent right knee arthrocentesis and joint fluid sent for analysis and she was noted to have elevated wbc 46K, fluid sent for culture in process. She was continued on antibiotics she was given oral Ceftin and otic gtt. ID consulted for further antibiotics management.   Based on her history and HPI, and results from her right knee aspirate fluid analysis and found to have elevated white cells I am highly suspicious of Right knee prosthetic knee infection.  ddx includes gout and pseudogout,   The patient reported she took a total of three dose of clarithromycin hopefully being on antibiotics will not influence bacterial cx and effect cx results  It is not complete clear if does have a true ear infection, she reports she was seen by a PA at urgent care center and made dx. I would favor an ENT consult to rule out ear infection. I would hold Ceftin for now, in case she goes to OR for knee wash out would like to optimize the  probability of obtaining high yield cultures   she is non toxic appearing, she is afebrile , vitals are stable   Leukocytosis noted but she is getting steroids here which will can cause an increase in the white cells  She is also being prescribe ofloxacin ear gtts   Plan   DC Ceftin, in case she needs to go to OR for washout or PJ knee explant, to obtain better yield of cultures results  follow up joint fluid cx   Surveillance blood cx also ordered   Suggest a ENT consult to confirm if the pt truly has an ear infection   reviewed case with surgery PA and Hospitalist attending

## 2021-07-24 NOTE — PROGRESS NOTE ADULT - ASSESSMENT
51F hx of HTN, HLD, s/p bl TKR 2006 pw L ear pain on biaxin with acute onset of bl knee pain.     # Bl knee pain with effusions in bl replaced knees.   -Crystal arthropathies r/o, uric acid WNL. Less likely reactive arthritis/autoimmune arthritis.  -trial of steroids.   -Discussed with ortho. Aspirate sent for anyalsis  -B/L LE dopplers are neg for DVT  -Standing toradol for 1 more day and PRN pain meds  - PT eval ordered    #HTN  -cont amlodipine, atenolol.  -cont hctz for now.     #Leukocytosis  - likely due to steroids  - monitor for now    #CKD II  - avoid nephrotoxic medications  - monitor BMP    #DVT/GI proph 51F hx of HTN, HLD, s/p bl TKR 2006 pw L ear pain on biaxin with acute onset of bl knee pain.     # Bl knee pain with effusions in bl replaced knees.   -Crystal arthropathies r/o, uric acid WNL. Less likely reactive arthritis/autoimmune arthritis.  -trial of steroids.   -Discussed with ortho. Aspirate sent for anyalsis  -B/L LE dopplers are neg for DVT  -Standing toradol for 1 more day and PRN pain meds  - PT eval ordered  - ID consult to get impression of analysis    #HTN  -cont amlodipine, atenolol.  -cont hctz for now.     #Leukocytosis  - likely due to steroids  - monitor for now    #CKD II  - avoid nephrotoxic medications  - monitor BMP    #DVT/GI proph 51F hx of HTN, HLD, s/p bl TKR 2006 pw L ear pain on biaxin with acute onset of bl knee pain.     # Bl knee pain with effusions in bl replaced knees.   -Crystal arthropathies r/o, uric acid WNL. Less likely reactive arthritis/autoimmune arthritis.  -trial of steroids.   -Discussed with ortho. Aspirate sent for anyalsis  -B/L LE dopplers are neg for DVT  -Standing toradol for 1 more day and PRN pain meds  - PT eval ordered  - ID consult to get impression of analysis    #HTN  -cont amlodipine, atenolol.  -cont hctz for now.     #Ear infection  - continue with Ceftin and ofloxacin     #Leukocytosis  - likely due to steroids  - monitor for now    #CKD II  - avoid nephrotoxic medications  - monitor BMP    #DVT/GI proph

## 2021-07-24 NOTE — PROGRESS NOTE ADULT - SUBJECTIVE AND OBJECTIVE BOX
S/P Aspiration of Right Knee     Patient was seen and examined by me this afternoon.  There were no acute events noted overnight.   Patient appears clinically improved.  Her left knee pain is almost gone.   The right knee remains painful but less edematous.   Right knee still has less ROM - flexion to approximately 60 degrees of flexion .    Vital Signs Last 24 Hrs  T(C): 36.1 (2021 15:19), Max: 36.4 (2021 05:34)  T(F): 97 (2021 15:19), Max: 97.6 (2021 05:34)  HR: 64 (2021 15:19) (61 - 64)  BP: 129/70 (2021 15:19) (121/62 - 129/70)  RR: 18 (2021 15:19) (18 - 19)  SpO2: 98% (2021 15:19) (96% - 98%)    PAST MEDICAL & SURGICAL HISTORY:  Hypertension  Hyperlipidemia  Gastroesophageal reflux disease  History of total knee replacement, bilateral  H/O:  X 4  History of partial hysterectomy    MEDICATIONS  (STANDING):  amLODIPine   Tablet 10 milliGRAM(s) Oral daily  ATENolol  Tablet 50 milliGRAM(s) Oral daily  enoxaparin Injectable 40 milliGRAM(s) SubCutaneous daily  hydrochlorothiazide 25 milliGRAM(s) Oral daily  ketorolac   Injectable 30 milliGRAM(s) IV Push three times a day  ofloxacin 0.3% Solution 10 Drop(s) Left Ear daily  pantoprazole    Tablet 40 milliGRAM(s) Oral two times a day  senna 2 Tablet(s) Oral at bedtime    PE:  Alert and oriented X 3  Lungs:  CTA   Cor RR S1S2  Abd:  soft, non tender +BS  Ext:   Left knee edema, decreased, good ROM actively by patient to 90 degreed of flexion  Right Knee remains edematous , improved ROM , flexion to approx 60 degrees of flexion   right and left knee + neurovascular intact                           14.8   20.43 )-----------( 293      ( 2021 05:48 )             42.2     Cell Count, Body Fluid (.21 @ 17:00)    Eosinophil Count - Body Fluid: 0 %    Other Body Cells: 0 %    Mesothelial Cells - Body Fluid: 0 %    Monocyte/Macrophage Count - Body Fluid: 22 %    Fluid Segmented Granulocytes: 76 %    Fluid Type: Synovial Fluid    Body Fluid Appearance: Turbid    BF Lymphocytes: 2 %    Atypical Lymphocytes - Body Fluid: 0 %    Nucleated Red Blood Cells - Body Fluid: 0    Tube Type: Sterile    Color - Body Fluid: Yellow    Total Nucleated Cell Count, Body Fluid: 22113: Peritoneal/Pleural/ Pericardial  Body Fluid Types            Total Nucleated cells: <500/uL            Neutrophils: <25%          Synovial Body Fluid Type           Total Nucleated cells: <150/uL           Neutrophils: <25%           Lymphocytes: <75%           Moncytes/Macrophages: </=70% /uL    Total RBC Count: 76739 /uL

## 2021-07-24 NOTE — PROGRESS NOTE ADULT - SUBJECTIVE AND OBJECTIVE BOX
CC: Patient is a 51y old  Female who presents with a chief complaint of bl knee pain (23 Jul 2021 17:03)      Interval History:  Patient seen and examined at bedside.  No overnight events  ortho send fluid from right need for analysis.  Patient able to move left leg better than yesterday. Pain level overall decreased. Right leg still unable to bend to 45 degrees      ALLERGIES:  penicillin (Rash (Mild))    MEDICATIONS  (STANDING):  amLODIPine   Tablet 10 milliGRAM(s) Oral daily  ATENolol  Tablet 50 milliGRAM(s) Oral daily  cefuroxime   Tablet 500 milliGRAM(s) Oral every 12 hours  enoxaparin Injectable 40 milliGRAM(s) SubCutaneous daily  hydrochlorothiazide 25 milliGRAM(s) Oral daily  ketorolac   Injectable 30 milliGRAM(s) IV Push three times a day  methylPREDNISolone 16 milliGRAM(s) Oral two times a day  ofloxacin 0.3% Solution 10 Drop(s) Left Ear daily  pantoprazole    Tablet 40 milliGRAM(s) Oral two times a day  senna 2 Tablet(s) Oral at bedtime    MEDICATIONS  (PRN):  acetaminophen   Tablet .. 650 milliGRAM(s) Oral every 6 hours PRN Temp greater or equal to 38C (100.4F), Mild Pain (1 - 3)  oxycodone    5 mG/acetaminophen 325 mG 1 Tablet(s) Oral every 6 hours PRN Severe Pain (7 - 10)  traMADol 50 milliGRAM(s) Oral every 6 hours PRN Moderate Pain (4 - 6)    Vital Signs Last 24 Hrs  T(F): 97.6 (24 Jul 2021 05:34), Max: 97.8 (23 Jul 2021 15:29)  HR: 64 (24 Jul 2021 05:34) (61 - 64)  BP: 128/76 (24 Jul 2021 05:34) (121/62 - 128/76)  RR: 19 (24 Jul 2021 05:34) (18 - 19)  SpO2: 96% (24 Jul 2021 05:34) (96% - 96%)  I&O's Summary    BMI (kg/m2): 33.9 (07-23-21 @ 02:37)    PHYSICAL EXAM:  GENERAL: NAD  HENT:  Atraumatic, Normocephalic; No tonsillar erythema, exudates, or enlargement; Moist mucous membranes  EYES: EOMI, PERRLA, conjunctiva and sclera clear, no lid-lag; moist conjunctivae  NECK: Supple, No JVD, Normal thyroid, FROM  NERVOUS SYSTEM:  CN II - XII intact; Sensation intact; follows commands  CHEST/LUNG: Clear to percussion bilaterally; No rales, rhonchi, wheezing, or rubs; normal respiratory effort, no intercostal retractions  HEART: Regular rate and rhythm; No murmurs, rubs, or gallops; no pitting edema  ABDOMEN: Soft, Nontender, Nondistended; Bowel sounds present; No HSM or masses  MUSCULOSKELETAL/EXTREMITIES:  2+ Peripheral Pulses, No clubbing or cyanosis; Swelling of B/L knee R> L  PSYCH: Appropriate affect, Alert & Oriented x 3, Good Memory and insight    LABS:                        14.8   20.43 )-----------( 293      ( 24 Jul 2021 05:48 )             42.2       07-24    136  |  100  |  29  ----------------------------<  148  3.6   |  27  |  1.02    Ca    9.7      24 Jul 2021 05:48    TPro  7.5  /  Alb  3.5  /  TBili  0.3  /  DBili  x   /  AST  12  /  ALT  21  /  AlkPhos  92  07-24     eGFR if Non African American: 63 mL/min/1.73M2 (07-24-21 @ 05:48)  eGFR if : 73 mL/min/1.73M2 (07-24-21 @ 05:48)       CARDIAC MARKERS ( 23 Jul 2021 04:30 )  x     / x     / 91 U/L / x     / x        Culture - Body Fluid with Gram Stain (collected 23 Jul 2021 17:00)  Source: .Body Fluid Knee Fluid  Gram Stain (24 Jul 2021 02:37):    polymorphonuclear leukocytes seen    No organisms seen    by cytocentrifuge      Care Discussed with Consultants/Other Providers: Yes

## 2021-07-24 NOTE — PHYSICAL THERAPY INITIAL EVALUATION ADULT - RANGE OF MOTION EXAMINATION, REHAB EVAL
right knee flexion ~70 degrees/bilateral upper extremity ROM was WNL (within normal limits)/Left LE ROM was WFL (within functional limits)

## 2021-07-24 NOTE — CONSULT NOTE ADULT - SUBJECTIVE AND OBJECTIVE BOX
HPI:   Patient is a 51y past medical history of HTN, HLD, s/p bl TKR 2006 pw L ear pain of about 2 days duration and had seen Urgent care 2 days ago and prescribed Biaxin 500mg bid and had 3 doses and ofloxacin ear drops was added yesterday and later developed acute onset of bilateral knee pain with swelling and unable to bend the right  knee secondary to significant pain and swelling. Denied trauma. Denied any fevers, chills, cough. The patient [facility] she was seen by ortho and she underwent right knee arthrocentesis and joint fluid sent for analysis and she was noted to have elevated wbc 46K, fluid sent for culture in process. She was continued on antibiotics she was given oral Ceftin and otic gtt. ID consulted for further antibiotics management.     REVIEW OF SYSTEMS:  All other review of systems negative (Comprehensive ROS)    PAST MEDICAL & SURGICAL HISTORY:  Hypertension    Hyperlipidemia    Gastroesophageal reflux disease    History of total knee replacement, bilateral    H/O:   x4    History of partial hysterectomy        Allergies    penicillin (Rash (Mild))    Intolerances            FAMILY HISTORY:  FH: thyroid disease (Mother, Sibling)        SOCIAL HISTORY:  Smoking:     ETOH:     Drug Use:     Single     T(F): 97.6 (21 @ 05:34), Max: 97.8 (21 @ 15:29)  HR: 64 (21 @ 05:34)  BP: 128/76 (21 @ 05:34)  RR: 19 (21 @ 05:34)  SpO2: 96% (21 @ 05:34)  Wt(kg): --    PHYSICAL EXAM:  General: alert, no acute distress  Eyes:  anicteric, no conjunctival injection, no discharge  Oropharynx: no lesions or injection 	  Neck: supple, without adenopathy  Lungs: clear to auscultation  Heart: regular rate and rhythm; no murmur, rubs or gallops  Abdomen: soft, nondistended, nontender, without mass or organomegaly  Skin: no lesions  Extremities: bilateral knee swelling, Right > Left, more restricted range of motion in terms of bending on the right leg, no erythema no redness seen on either knee.   Neurologic: alert, oriented, moves all extremities    LAB RESULTS:                        14.8   20.43 )-----------( 293      ( 2021 05:48 )             42.2     07-24    136  |  100  |  29<H>  ----------------------------<  148<H>  3.6   |  27  |  1.02    Ca    9.7      2021 05:48    TPro  7.5  /  Alb  3.5  /  TBili  0.3  /  DBili  x   /  AST  12  /  ALT  21  /  AlkPhos  92      LIVER FUNCTIONS - ( 2021 05:48 )  Alb: 3.5 g/dL / Pro: 7.5 g/dL / ALK PHOS: 92 U/L / ALT: 21 U/L / AST: 12 U/L / GGT: x               MICROBIOLOGY:  RECENT CULTURES:   @ 17:00 .Body Fluid Knee Fluid       polymorphonuclear leukocytes seen  No organisms seen  by cytocentrifuge          RADIOLOGY REVIEWED:      Antimicrobials Day #      Other Medications:  acetaminophen   Tablet .. 650 milliGRAM(s) Oral every 6 hours PRN  amLODIPine   Tablet 10 milliGRAM(s) Oral daily  ATENolol  Tablet 50 milliGRAM(s) Oral daily  enoxaparin Injectable 40 milliGRAM(s) SubCutaneous daily  hydrochlorothiazide 25 milliGRAM(s) Oral daily  ketorolac   Injectable 30 milliGRAM(s) IV Push three times a day  methylPREDNISolone 16 milliGRAM(s) Oral two times a day  ofloxacin 0.3% Solution 10 Drop(s) Left Ear daily  oxycodone    5 mG/acetaminophen 325 mG 1 Tablet(s) Oral every 6 hours PRN  pantoprazole    Tablet 40 milliGRAM(s) Oral two times a day  senna 2 Tablet(s) Oral at bedtime  traMADol 50 milliGRAM(s) Oral every 6 hours PRN

## 2021-07-25 LAB
ALBUMIN SERPL ELPH-MCNC: 3 G/DL — LOW (ref 3.3–5)
ALP SERPL-CCNC: 78 U/L — SIGNIFICANT CHANGE UP (ref 40–120)
ALT FLD-CCNC: 21 U/L — SIGNIFICANT CHANGE UP (ref 10–45)
ANION GAP SERPL CALC-SCNC: 6 MMOL/L — SIGNIFICANT CHANGE UP (ref 5–17)
AST SERPL-CCNC: 14 U/L — SIGNIFICANT CHANGE UP (ref 10–40)
BILIRUB SERPL-MCNC: 0.2 MG/DL — SIGNIFICANT CHANGE UP (ref 0.2–1.2)
BUN SERPL-MCNC: 31 MG/DL — HIGH (ref 7–23)
CALCIUM SERPL-MCNC: 9 MG/DL — SIGNIFICANT CHANGE UP (ref 8.4–10.5)
CHLORIDE SERPL-SCNC: 104 MMOL/L — SIGNIFICANT CHANGE UP (ref 96–108)
CO2 SERPL-SCNC: 30 MMOL/L — SIGNIFICANT CHANGE UP (ref 22–31)
CREAT SERPL-MCNC: 0.95 MG/DL — SIGNIFICANT CHANGE UP (ref 0.5–1.3)
CRP SERPL-MCNC: 52 MG/L — HIGH
GLUCOSE SERPL-MCNC: 117 MG/DL — HIGH (ref 70–99)
HCT VFR BLD CALC: 41.1 % — SIGNIFICANT CHANGE UP (ref 34.5–45)
HGB BLD-MCNC: 14.1 G/DL — SIGNIFICANT CHANGE UP (ref 11.5–15.5)
MCHC RBC-ENTMCNC: 29.6 PG — SIGNIFICANT CHANGE UP (ref 27–34)
MCHC RBC-ENTMCNC: 34.3 GM/DL — SIGNIFICANT CHANGE UP (ref 32–36)
MCV RBC AUTO: 86.3 FL — SIGNIFICANT CHANGE UP (ref 80–100)
NRBC # BLD: 0 /100 WBCS — SIGNIFICANT CHANGE UP (ref 0–0)
PLATELET # BLD AUTO: 263 K/UL — SIGNIFICANT CHANGE UP (ref 150–400)
POTASSIUM SERPL-MCNC: 3.9 MMOL/L — SIGNIFICANT CHANGE UP (ref 3.5–5.3)
POTASSIUM SERPL-SCNC: 3.9 MMOL/L — SIGNIFICANT CHANGE UP (ref 3.5–5.3)
PROT SERPL-MCNC: 6.4 G/DL — SIGNIFICANT CHANGE UP (ref 6–8.3)
RBC # BLD: 4.76 M/UL — SIGNIFICANT CHANGE UP (ref 3.8–5.2)
RBC # FLD: 12.4 % — SIGNIFICANT CHANGE UP (ref 10.3–14.5)
SODIUM SERPL-SCNC: 140 MMOL/L — SIGNIFICANT CHANGE UP (ref 135–145)
TSH SERPL-MCNC: 1.46 UIU/ML — SIGNIFICANT CHANGE UP (ref 0.27–4.2)
WBC # BLD: 18.98 K/UL — HIGH (ref 3.8–10.5)
WBC # FLD AUTO: 18.98 K/UL — HIGH (ref 3.8–10.5)

## 2021-07-25 PROCEDURE — 99232 SBSQ HOSP IP/OBS MODERATE 35: CPT

## 2021-07-25 PROCEDURE — 99233 SBSQ HOSP IP/OBS HIGH 50: CPT

## 2021-07-25 RX ADMIN — OXYCODONE AND ACETAMINOPHEN 1 TABLET(S): 5; 325 TABLET ORAL at 20:41

## 2021-07-25 RX ADMIN — PANTOPRAZOLE SODIUM 40 MILLIGRAM(S): 20 TABLET, DELAYED RELEASE ORAL at 05:50

## 2021-07-25 RX ADMIN — SENNA PLUS 2 TABLET(S): 8.6 TABLET ORAL at 22:30

## 2021-07-25 RX ADMIN — OXYCODONE AND ACETAMINOPHEN 1 TABLET(S): 5; 325 TABLET ORAL at 12:15

## 2021-07-25 RX ADMIN — Medication 650 MILLIGRAM(S): at 09:49

## 2021-07-25 RX ADMIN — Medication 10 DROP(S): at 11:16

## 2021-07-25 RX ADMIN — Medication 25 MILLIGRAM(S): at 05:50

## 2021-07-25 RX ADMIN — AMLODIPINE BESYLATE 10 MILLIGRAM(S): 2.5 TABLET ORAL at 05:50

## 2021-07-25 RX ADMIN — ATENOLOL 50 MILLIGRAM(S): 25 TABLET ORAL at 05:50

## 2021-07-25 RX ADMIN — PANTOPRAZOLE SODIUM 40 MILLIGRAM(S): 20 TABLET, DELAYED RELEASE ORAL at 17:07

## 2021-07-25 RX ADMIN — Medication 30 MILLIGRAM(S): at 05:50

## 2021-07-25 RX ADMIN — OXYCODONE AND ACETAMINOPHEN 1 TABLET(S): 5; 325 TABLET ORAL at 11:16

## 2021-07-25 RX ADMIN — ENOXAPARIN SODIUM 40 MILLIGRAM(S): 100 INJECTION SUBCUTANEOUS at 11:16

## 2021-07-25 RX ADMIN — Medication 650 MILLIGRAM(S): at 10:40

## 2021-07-25 RX ADMIN — Medication 30 MILLIGRAM(S): at 06:05

## 2021-07-25 RX ADMIN — OXYCODONE AND ACETAMINOPHEN 1 TABLET(S): 5; 325 TABLET ORAL at 19:41

## 2021-07-25 NOTE — PROGRESS NOTE ADULT - SUBJECTIVE AND OBJECTIVE BOX
Patient is a 51y old  Female who presents with a chief complaint of bl knee pain       Patient seen and examined at bedside. Feeling much better. Left knee pain resolved, right pain improved, but still achy. Reports continued left aching, sharp ear pain     ALLERGIES:  penicillin (Rash (Mild))    MEDICATIONS  (STANDING):  amLODIPine   Tablet 10 milliGRAM(s) Oral daily  ATENolol  Tablet 50 milliGRAM(s) Oral daily  enoxaparin Injectable 40 milliGRAM(s) SubCutaneous daily  hydrochlorothiazide 25 milliGRAM(s) Oral daily  ofloxacin 0.3% Solution 10 Drop(s) Left Ear daily  pantoprazole    Tablet 40 milliGRAM(s) Oral two times a day  senna 2 Tablet(s) Oral at bedtime    MEDICATIONS  (PRN):  acetaminophen   Tablet .. 650 milliGRAM(s) Oral every 6 hours PRN Temp greater or equal to 38C (100.4F), Mild Pain (1 - 3)  oxycodone    5 mG/acetaminophen 325 mG 1 Tablet(s) Oral every 6 hours PRN Severe Pain (7 - 10)  traMADol 50 milliGRAM(s) Oral every 6 hours PRN Moderate Pain (4 - 6)    Vital Signs Last 24 Hrs  T(F): 97.9 (25 Jul 2021 05:41), Max: 97.9 (25 Jul 2021 05:41)  HR: 64 (25 Jul 2021 05:41) (57 - 64)  BP: 147/91 (25 Jul 2021 05:41) (118/65 - 147/91)  RR: 16 (25 Jul 2021 05:41) (16 - 18)  SpO2: 95% (25 Jul 2021 05:41) (95% - 98%)  I&O's Summary    24 Jul 2021 07:01  -  25 Jul 2021 07:00  --------------------------------------------------------  IN: 1000 mL / OUT: 0 mL / NET: 1000 mL    25 Jul 2021 07:01  -  25 Jul 2021 10:08  --------------------------------------------------------  IN: 500 mL / OUT: 0 mL / NET: 500 mL      PHYSICAL EXAM:  General: NAD, A/O x 3  ENT: MMM, no oral thrush   Neck: Supple, No JVD  Lungs: Clear to auscultation bilaterally, non labored breathing  Cardio: RRR, S1/S2, + murmur  Abdomen: Soft, Nontender, Nondistended; Bowel sounds present  Extremities: No calf tenderness, No pitting edema  Muskuloskeletal: + FROM lower and upper extremities     LABS:                        14.1   18.98 )-----------( 263      ( 25 Jul 2021 05:45 )             41.1     07-25    140  |  104  |  31  ----------------------------<  117  3.9   |  30  |  0.95    Ca    9.0      25 Jul 2021 05:45    TPro  6.4  /  Alb  3.0  /  TBili  0.2  /  DBili  x   /  AST  14  /  ALT  21  /  AlkPhos  78  07-25    eGFR if Non : 69 mL/min/1.73M2 (07-25-21 @ 05:45)  eGFR if African American: 80 mL/min/1.73M2 (07-25-21 @ 05:45)        CARDIAC MARKERS ( 23 Jul 2021 04:30 )  x     / x     / 91 U/L / x     / x                  Culture - Body Fluid with Gram Stain (collected 23 Jul 2021 17:00)  Source: .Body Fluid Knee Fluid  Gram Stain (24 Jul 2021 02:37):    polymorphonuclear leukocytes seen    No organisms seen    by cytocentrifuge  Preliminary Report (24 Jul 2021 17:57):    No growth          RADIOLOGY & ADDITIONAL TESTS:    Care Discussed with Consultants/Other Providers:    Patient is a 51y old  Female who presents with a chief complaint of bl knee pain       Patient seen and examined at bedside. Feeling much better. Left knee pain resolved, right pain improved, but still achy. Reports continued left aching, sharp ear pain     ALLERGIES:  penicillin (Rash (Mild))    MEDICATIONS  (STANDING):  amLODIPine   Tablet 10 milliGRAM(s) Oral daily  ATENolol  Tablet 50 milliGRAM(s) Oral daily  enoxaparin Injectable 40 milliGRAM(s) SubCutaneous daily  hydrochlorothiazide 25 milliGRAM(s) Oral daily  ofloxacin 0.3% Solution 10 Drop(s) Left Ear daily  pantoprazole    Tablet 40 milliGRAM(s) Oral two times a day  senna 2 Tablet(s) Oral at bedtime    MEDICATIONS  (PRN):  acetaminophen   Tablet .. 650 milliGRAM(s) Oral every 6 hours PRN Temp greater or equal to 38C (100.4F), Mild Pain (1 - 3)  oxycodone    5 mG/acetaminophen 325 mG 1 Tablet(s) Oral every 6 hours PRN Severe Pain (7 - 10)  traMADol 50 milliGRAM(s) Oral every 6 hours PRN Moderate Pain (4 - 6)    Vital Signs Last 24 Hrs  T(F): 97.9 (25 Jul 2021 05:41), Max: 97.9 (25 Jul 2021 05:41)  HR: 64 (25 Jul 2021 05:41) (57 - 64)  BP: 147/91 (25 Jul 2021 05:41) (118/65 - 147/91)  RR: 16 (25 Jul 2021 05:41) (16 - 18)  SpO2: 95% (25 Jul 2021 05:41) (95% - 98%)  I&O's Summary    24 Jul 2021 07:01  -  25 Jul 2021 07:00  --------------------------------------------------------  IN: 1000 mL / OUT: 0 mL / NET: 1000 mL    25 Jul 2021 07:01  -  25 Jul 2021 10:08  --------------------------------------------------------  IN: 500 mL / OUT: 0 mL / NET: 500 mL      PHYSICAL EXAM:  General: NAD, A/O x 3  ENT: MMM, no oral thrush   Neck: Supple, No JVD  Lungs: Clear to auscultation bilaterally, non labored breathing  Cardio: RRR, S1/S2, + murmur  Abdomen: Soft, Nontender, Nondistended; Bowel sounds present  Extremities: No calf tenderness, No pitting edema  Muskuloskeletal: + FROM lower and upper extremities     LABS:                        14.1   18.98 )-----------( 263      ( 25 Jul 2021 05:45 )             41.1     07-25    140  |  104  |  31  ----------------------------<  117  3.9   |  30  |  0.95    Ca    9.0      25 Jul 2021 05:45    TPro  6.4  /  Alb  3.0  /  TBili  0.2  /  DBili  x   /  AST  14  /  ALT  21  /  AlkPhos  78  07-25    eGFR if Non : 69 mL/min/1.73M2 (07-25-21 @ 05:45)  eGFR if African American: 80 mL/min/1.73M2 (07-25-21 @ 05:45)        CARDIAC MARKERS ( 23 Jul 2021 04:30 )  x     / x     / 91 U/L / x     / x                  Culture - Body Fluid with Gram Stain (collected 23 Jul 2021 17:00)  Source: .Body Fluid Knee Fluid  Gram Stain (24 Jul 2021 02:37):    polymorphonuclear leukocytes seen    No organisms seen    by cytocentrifuge  Preliminary Report (24 Jul 2021 17:57):    No growth          RADIOLOGY & ADDITIONAL TESTS:    Care Discussed with Consultants/Other Providers: Ortho and ID

## 2021-07-25 NOTE — PROGRESS NOTE ADULT - ASSESSMENT
51F hx of HTN, HLD, s/p bl TKR 2006 pw L ear pain on biaxin with acute onset of bl knee pain.     # Bl knee pain with effusions in bl replaced knees 2006.   -Improving  -Crystal arthropathies r/o, uric acid WNL. Less likely reactive arthritis/autoimmune arthritis.  -continue trial of steroids.   -right knee aspirate, no organisms seen, + polymorphonuclear leukocytes  -ortho recs, possible knee washout  -Infectious disease recs   -B/L LE dopplers are neg for DVT  - PRN pain meds  - PT eval ordered      #HTN  -cont amlodipine, atenolol.  -cont hctz for now.     #Ear infection  - Ceftin DC'd, continue ofloxacin   -ENT consult to r/o true infection      #Leukocytosis  - likely due to steroids  - monitor for now    #CKD II  - avoid nephrotoxic medications  - monitor BMP    #DVT/GI proph    Updated  Pat [phone] 51F hx of HTN, HLD, s/p bl TKR 2006 pw L ear pain on biaxin with acute onset of bl knee pain.     # Bl knee pain with effusions in bl replaced knees 2006. R>L   -Improving  -Crystal arthropathies r/o, uric acid WNL. Less likely reactive arthritis/autoimmune arthritis.  -D/C'd steroids.   -right knee aspirate, no organisms seen, + polymorphonuclear leukocytes  -ortho recs, possible knee washout  -Infectious disease recs noted  -B/L LE dopplers are neg for DVT  -PRN pain meds  -PT eval noted    #HTN  -conitnue amlodipine, atenolol.  -continue hctz for now.     #Ear infection  -Ceftin DC'd, continue ofloxacin   -ENT consult to r/o true infection and will see the patient     #GERD  -continue protonix    #Leukocytosis  -likely due to steroids; r/o septic joint  -monitor for now    #CKD II  -avoid nephrotoxic medications  -monitor BMP    #DVT ppx - lovenox    Updated  Pat [phone] 51F hx of HTN, HLD, s/p bl TKR 2006 pw L ear pain on biaxin with acute onset of bl knee pain.     # Bl knee pain with effusions in bl replaced knees 2006. R>L   -Improving  -Crystal arthropathies r/o, uric acid WNL. Less likely reactive arthritis/autoimmune arthritis.  -D/C'd steroids.   -right knee aspirate, no organisms seen, + polymorphonuclear leukocytes  -ortho recs, possible knee washout; rheum consult  -Infectious disease recs noted  -B/L LE dopplers are neg for DVT  -PRN pain meds  -PT eval noted    #HTN  -conitnue amlodipine, atenolol.  -continue hctz for now.     #Ear infection  -Ceftin DC'd, continue ofloxacin   -ENT consult to r/o true infection and will see the patient     #GERD  -continue protonix    #Leukocytosis  -likely due to steroids; r/o septic joint  -monitor for now    #CKD II  -avoid nephrotoxic medications  -monitor BMP    #DVT ppx - lovenox    Updated  Pat [phone]

## 2021-07-25 NOTE — PROGRESS NOTE ADULT - SUBJECTIVE AND OBJECTIVE BOX
Right Knee effusion:  POD #: S/P Arthrocentesis Right knee 7/23 PM  S: Pt without complaints. Right knee pain improved, motion improved. Off Antibiotics per ID MD. Off Steroids  Pain comfortable (3/10 ) on  Interval Rx.  Walked with walker. Of note had Left side dental abcess drained at dentist 1 month ago.  Of note had bilateral foot, ankle, calf swelling visiting family in [place] on [facility] - Urgent Care work up Neg by Hx. in [place]  Pain Rx:  acetaminophen   Tablet .. 650 milliGRAM(s) Oral every 6 hours PRN  oxycodone    5 mG/acetaminophen 325 mG 1 Tablet(s) Oral every 6 hours PRN  traMADol 50 milliGRAM(s) Oral every 6 hours PRN    O: General: On exam, No Apparent Distress  Vital Signs Last 24 Hrs  T(C): 36.6 (25 Jul 2021 05:41), Max: 36.6 (25 Jul 2021 05:41)  T(F): 97.9 (25 Jul 2021 05:41), Max: 97.9 (25 Jul 2021 05:41)  HR: 64 (25 Jul 2021 05:41) (57 - 64)  BP: 147/91 (25 Jul 2021 05:41) (118/65 - 147/91)  RR: 16 (25 Jul 2021 05:41) (16 - 18)  SpO2: 95% (25 Jul 2021 05:41) (95% - 98%)    Ext(Knee): Right Knee; No erythema; No differential warmth to Left - Scant; No  cellulitis; Bare Min. effusion; No soft tissue swelling; No Right knee tenderness  ROM: Extension 0 deg. ; Flexion 120 deg. A & PROM comfortable  Neurologic:  Has sensation over feet & toes bilat. Full AROM bilat feet & toes. EHL/AT = 5/5  Vascular: Feet toes warm, pink. DP = 2+. No calf tenderness bilat..    VTEP: On Bilat. Venodynes + enoxaparin Injectable 40 milliGRAM(s) SubCutaneous daily    Hospitalist input today noted.    Labs Today:   CBC:                      14.1   18.98 )-----------( 263      ( 25 Jul 2021 05:45 )             41.1     07-25  Chem:  140  |  104  |  31<H>  ----------------------------<  117<H>  3.9   |  30  |  0.95    C&S from Aspiration 7/23 Neg to date    Primary Orthopedic Assessment:  • Stable from Orthopedic perspective - improved Right knee clinical status  • Neuro motor exam stable  • Labs: CBC with elev. WBC, min. improved from yest. / Chem stable      Plan:   • Continue:  ambulation with assistance of a walker/Ice to knee PRN / Knee ROM as tolerated     • Continue DVT prophylaxis as prescribed, including use of compression devices and ankle pumps  • Continue Pain Rx  • Plans per Medicine / Discussed in detail with Hospitalist. Would wait for Knee fluid C&S to be Negative at least 3 days. Felt formal consult with     Rheumatology would be helpful. Hospital to consult Dr. Joyce in AM. Will repeat Markers in AM.  • Discharge planning – anticipated discharge is Home D/C as long as Right knee continues to clinically improve.  Can then F/U with revision TKR Ortho MD Dr. WALLACE Ann in Ortho practice for further workup of knee and Rheumatology F/U

## 2021-07-25 NOTE — PROGRESS NOTE ADULT - SUBJECTIVE AND OBJECTIVE BOX
CC: f/u for right knee pain and swelling , elevated wbc from joint fluid analysis    Patient reports her knees are less swelling, she reports right ear pain     REVIEW OF SYSTEMS:  All other review of systems negative (Comprehensive ROS)      T(F): 97.9 (07-25-21 @ 05:41), Max: 97.9 (07-25-21 @ 05:41)  HR: 64 (07-25-21 @ 05:41)  BP: 147/91 (07-25-21 @ 05:41)  RR: 16 (07-25-21 @ 05:41)  SpO2: 95% (07-25-21 @ 05:41)  Wt(kg): --    PHYSICAL EXAM:  General: alert, no acute distress  Eyes:  anicteric, no conjunctival injection, no discharge  Oropharynx: no lesions or injection 	  Neck: supple, without adenopathy  Lungs: clear to auscultation  Heart: regular rate and rhythm; no murmur, rubs or gallops  Abdomen: soft, nondistended, nontender, without mass or organomegaly  Skin: no lesions  Extremities: no clubbing, cyanosis, or edema  Right knee less edema , no erythema, no cellulitis   Neurologic: alert, oriented, moves all extremities    LAB RESULTS:                        14.1   18.98 )-----------( 263      ( 25 Jul 2021 05:45 )             41.1     07-25    140  |  104  |  31<H>  ----------------------------<  117<H>  3.9   |  30  |  0.95    Ca    9.0      25 Jul 2021 05:45    TPro  6.4  /  Alb  3.0<L>  /  TBili  0.2  /  DBili  x   /  AST  14  /  ALT  21  /  AlkPhos  78  07-25    LIVER FUNCTIONS - ( 25 Jul 2021 05:45 )  Alb: 3.0 g/dL / Pro: 6.4 g/dL / ALK PHOS: 78 U/L / ALT: 21 U/L / AST: 14 U/L / GGT: x             MICROBIOLOGY:  RECENT CULTURES:  07-23 @ 17:00 .Body Fluid Knee Fluid     No growth    polymorphonuclear leukocytes seen  No organisms seen  by cytocentrifuge        RADIOLOGY REVIEWED:        Antimicrobials Day #      Other Medications Reviewed

## 2021-07-25 NOTE — PROGRESS NOTE ADULT - ASSESSMENT
51y past medical history of HTN, HLD, s/p bl TKR 2006 pw L ear pain of about 2 days duration and had seen Urgent care 2 days ago and prescribed Biaxin 500mg bid and had 3 doses and ofloxacin ear drops was added yesterday and later developed acute onset of bilateral knee pain with swelling and unable to bend the right  knee secondary to significant pain and swelling. Denied trauma. Denied any fevers, chills, cough. The patient admitted to [facility] she was seen by ortho and she underwent right knee arthrocentesis and joint fluid sent for analysis and she was noted to have elevated wbc 46K, fluid sent for culture in process. She was continued on antibiotics she was given oral Ceftin and otic gtt. ID consulted for further antibiotics management.   Based on her history and HPI, and results from her right knee aspirate fluid analysis and found to have elevated white cells I am highly suspicious of Right knee prosthetic knee infection.  ddx includes gout and pseudogout,   The patient reported she took a total of three dose of clarithromycin hopefully being on antibiotics will not influence bacterial cx and effect cx results  It is not complete clear if does have a true ear infection, she reports she was seen by a PA at urgent care center and made dx. I would favor an ENT consult to rule out ear infection. I would hold Ceftin for now, in case she goes to OR for knee wash out would like to optimize the  probability of obtaining high yield cultures   she is non toxic appearing, she is afebrile , vitals are stable   Leukocytosis noted but she is getting steroids here which will can cause an increase in the white cells  She is also being prescribe ofloxacin ear gtts   reviewed fluid cx is no growth to date   surveillance blood cx ordered yesterday in process   right knee appears less edema today, no erythema       Plan   Monitor off abx in  case she needs to go to OR for washout or PJ knee explant, she is being followed by ortho   follow up joint fluid cx   follow up  blood cx also ordered    ENT consult to confirm if the pt truly has an ear infection   Continue supportive care per medicine and Ortho teams

## 2021-07-26 ENCOUNTER — TRANSCRIPTION ENCOUNTER (OUTPATIENT)
Age: 51
End: 2021-07-26

## 2021-07-26 DIAGNOSIS — H92.02 OTALGIA, LEFT EAR: ICD-10-CM

## 2021-07-26 PROBLEM — Z00.00 ENCOUNTER FOR PREVENTIVE HEALTH EXAMINATION: Status: ACTIVE | Noted: 2021-07-26

## 2021-07-26 LAB
ALBUMIN SERPL ELPH-MCNC: 3.2 G/DL — LOW (ref 3.3–5)
ALP SERPL-CCNC: 76 U/L — SIGNIFICANT CHANGE UP (ref 40–120)
ALT FLD-CCNC: 30 U/L — SIGNIFICANT CHANGE UP (ref 10–45)
ANION GAP SERPL CALC-SCNC: 6 MMOL/L — SIGNIFICANT CHANGE UP (ref 5–17)
AST SERPL-CCNC: 16 U/L — SIGNIFICANT CHANGE UP (ref 10–40)
BILIRUB SERPL-MCNC: 0.2 MG/DL — SIGNIFICANT CHANGE UP (ref 0.2–1.2)
BUN SERPL-MCNC: 24 MG/DL — HIGH (ref 7–23)
CALCIUM SERPL-MCNC: 9 MG/DL — SIGNIFICANT CHANGE UP (ref 8.4–10.5)
CALCIUM SERPL-MCNC: 9.5 MG/DL — SIGNIFICANT CHANGE UP (ref 8.4–10.5)
CHLORIDE SERPL-SCNC: 103 MMOL/L — SIGNIFICANT CHANGE UP (ref 96–108)
CO2 SERPL-SCNC: 31 MMOL/L — SIGNIFICANT CHANGE UP (ref 22–31)
CREAT SERPL-MCNC: 0.82 MG/DL — SIGNIFICANT CHANGE UP (ref 0.5–1.3)
CRP SERPL-MCNC: 10 MG/L — HIGH
ERYTHROCYTE [SEDIMENTATION RATE] IN BLOOD: 16 MM/HR — SIGNIFICANT CHANGE UP (ref 0–20)
GLUCOSE SERPL-MCNC: 78 MG/DL — SIGNIFICANT CHANGE UP (ref 70–99)
HCT VFR BLD CALC: 45.8 % — HIGH (ref 34.5–45)
HGB BLD-MCNC: 15.3 G/DL — SIGNIFICANT CHANGE UP (ref 11.5–15.5)
MAGNESIUM SERPL-MCNC: 2.1 MG/DL — SIGNIFICANT CHANGE UP (ref 1.6–2.6)
MCHC RBC-ENTMCNC: 30.1 PG — SIGNIFICANT CHANGE UP (ref 27–34)
MCHC RBC-ENTMCNC: 33.4 GM/DL — SIGNIFICANT CHANGE UP (ref 32–36)
MCV RBC AUTO: 90 FL — SIGNIFICANT CHANGE UP (ref 80–100)
NRBC # BLD: 0 /100 WBCS — SIGNIFICANT CHANGE UP (ref 0–0)
PHOSPHATE SERPL-MCNC: 3.2 MG/DL — SIGNIFICANT CHANGE UP (ref 2.5–4.5)
PLATELET # BLD AUTO: 282 K/UL — SIGNIFICANT CHANGE UP (ref 150–400)
POTASSIUM SERPL-MCNC: 4 MMOL/L — SIGNIFICANT CHANGE UP (ref 3.5–5.3)
POTASSIUM SERPL-SCNC: 4 MMOL/L — SIGNIFICANT CHANGE UP (ref 3.5–5.3)
PROT SERPL-MCNC: 6.6 G/DL — SIGNIFICANT CHANGE UP (ref 6–8.3)
PTH-INTACT FLD-MCNC: 26 PG/ML — SIGNIFICANT CHANGE UP (ref 15–65)
RBC # BLD: 5.09 M/UL — SIGNIFICANT CHANGE UP (ref 3.8–5.2)
RBC # FLD: 12.6 % — SIGNIFICANT CHANGE UP (ref 10.3–14.5)
RHEUMATOID FACT SERPL-ACNC: 10 IU/ML — SIGNIFICANT CHANGE UP (ref 0–13)
SODIUM SERPL-SCNC: 140 MMOL/L — SIGNIFICANT CHANGE UP (ref 135–145)
WBC # BLD: 7.89 K/UL — SIGNIFICANT CHANGE UP (ref 3.8–10.5)
WBC # FLD AUTO: 7.89 K/UL — SIGNIFICANT CHANGE UP (ref 3.8–10.5)

## 2021-07-26 PROCEDURE — 99232 SBSQ HOSP IP/OBS MODERATE 35: CPT

## 2021-07-26 PROCEDURE — 99221 1ST HOSP IP/OBS SF/LOW 40: CPT

## 2021-07-26 RX ADMIN — AMLODIPINE BESYLATE 10 MILLIGRAM(S): 2.5 TABLET ORAL at 06:12

## 2021-07-26 RX ADMIN — PANTOPRAZOLE SODIUM 40 MILLIGRAM(S): 20 TABLET, DELAYED RELEASE ORAL at 06:12

## 2021-07-26 RX ADMIN — ATENOLOL 50 MILLIGRAM(S): 25 TABLET ORAL at 06:12

## 2021-07-26 RX ADMIN — PANTOPRAZOLE SODIUM 40 MILLIGRAM(S): 20 TABLET, DELAYED RELEASE ORAL at 19:01

## 2021-07-26 RX ADMIN — ENOXAPARIN SODIUM 40 MILLIGRAM(S): 100 INJECTION SUBCUTANEOUS at 12:27

## 2021-07-26 RX ADMIN — Medication 25 MILLIGRAM(S): at 06:12

## 2021-07-26 NOTE — DISCHARGE NOTE NURSING/CASE MANAGEMENT/SOCIAL WORK - PATIENT PORTAL LINK FT
You can access the FollowMyHealth Patient Portal offered by [facility] by registering at the following website: [email or web address]. By joining [employer]’s FollowMyHealth portal, you will also be able to view your health information using other applications (apps) compatible with our system.

## 2021-07-26 NOTE — PROGRESS NOTE ADULT - ASSESSMENT
51F hx of HTN, HLD, s/p bl TKR 2006, pw L ear pain on biaxin with acute onset of bl knee pain.     #Bilateral knee pain with effusions, in bilateral replaced knees 2006 - suspect prosthetic joint infection  - Symptoms improving  - Right knee aspirate fluid analysis showed elevated white cells  - Crystal arthropathies r/o, uric acid WNL. Less likely reactive arthritis/autoimmune arthritis.  - Bilateral LE dopplers are negative for DVT  - Currently off steroids, off all antibiotics  - Follow up knee fluid culture  - Follow up blood cultures   - ID consulted  - Orthopedics consulted  - Rheumatology consult pending  - ENT consult pending  - Eventual home with outpatient PT    #Ear infection  - Ceftin DC'd, continue ofloxacin   - ENT consult to r/o true infection and will see the patient today    #HTN  - Continue amlodipine, atenolol, HCTZ    #GERD  - Continue protonix    #Leukocytosis  - Likely due to steroids; r/o septic joint  - Monitor for now    #CKD II  - Avoid nephrotoxic medications  - Monitor BMP    #DVT ppx   - Lovenox    Dispo: Home with outpatient PT once cleared by ortho, rheum, ID     Pat [phone]

## 2021-07-26 NOTE — PROGRESS NOTE ADULT - SUBJECTIVE AND OBJECTIVE BOX
Patient is a 51y old  Female who presents with a chief complaint of bl knee pain (26 Jul 2021 07:25)    Patient seen and examined at bedside. Patient is c/o left ear pain but knees are feeling much better    ALLERGIES:  penicillin (Rash (Mild))    MEDICATIONS  (STANDING):  amLODIPine   Tablet 10 milliGRAM(s) Oral daily  ATENolol  Tablet 50 milliGRAM(s) Oral daily  enoxaparin Injectable 40 milliGRAM(s) SubCutaneous daily  hydrochlorothiazide 25 milliGRAM(s) Oral daily  ofloxacin 0.3% Solution 10 Drop(s) Left Ear daily  pantoprazole    Tablet 40 milliGRAM(s) Oral two times a day  senna 2 Tablet(s) Oral at bedtime    MEDICATIONS  (PRN):  acetaminophen   Tablet .. 650 milliGRAM(s) Oral every 6 hours PRN Temp greater or equal to 38C (100.4F), Mild Pain (1 - 3)  oxycodone    5 mG/acetaminophen 325 mG 1 Tablet(s) Oral every 6 hours PRN Severe Pain (7 - 10)  traMADol 50 milliGRAM(s) Oral every 6 hours PRN Moderate Pain (4 - 6)    Vital Signs Last 24 Hrs  T(F): 97.6 (26 Jul 2021 06:10), Max: 97.9 (25 Jul 2021 15:45)  HR: 61 (26 Jul 2021 06:10) (55 - 61)  BP: 134/79 (26 Jul 2021 06:10) (93/74 - 134/79)  RR: 15 (26 Jul 2021 06:10) (15 - 17)  SpO2: 96% (26 Jul 2021 06:10) (95% - 96%)    I&O's Summary  25 Jul 2021 07:01  -  26 Jul 2021 07:00  --------------------------------------------------------  IN: 1000 mL / OUT: 0 mL / NET: 1000 mL    PHYSICAL EXAM:  GENERAL: NAD, well-groomed, well-developed  HEAD:  Atraumatic, Normocephalic  EYES: EOMI, conjunctiva and sclera clear  ENMT: Moist mucous membranes, Good dentition, no thrush  NECK: Supple, No JVD  CHEST/LUNG: Clear to auscultation bilaterally, good air entry, non-labored breathing  HEART: RRR; S1/S2, No murmur  ABDOMEN: Soft, Nontender, Nondistended; Bowel sounds present  VASCULAR: Normal pulses, Normal capillary refill  EXTREMITIES: No calf tenderness, No cyanosis, No edema  SKIN: Warm, Intact  NERVOUS SYSTEM:  A/O x3, No focal deficits    LABS:                        15.3   7.89  )-----------( 282      ( 26 Jul 2021 06:30 )             45.8     07-26    140  |  103  |  24  ----------------------------<  78  4.0   |  31  |  0.82    Ca    9.0      26 Jul 2021 06:30  Phos  3.2     07-26  Mg     2.1     07-26    TPro  6.6  /  Alb  3.2  /  TBili  0.2  /  DBili  x   /  AST  16  /  ALT  30  /  AlkPhos  76  07-26    eGFR if Non African American: 82 mL/min/1.73M2 (07-26-21 @ 06:30)  eGFR if African American: 95 mL/min/1.73M2 (07-26-21 @ 06:30)    TSH 1.46   TSH with FT4 reflex --  Total T3 --    Culture - Blood (collected 24 Jul 2021 15:15)  Source: .Blood Blood-Venous  Preliminary Report (25 Jul 2021 22:01):    No growth to date.    Culture - Blood (collected 24 Jul 2021 15:15)  Source: .Blood Blood-Peripheral  Preliminary Report (25 Jul 2021 22:01):    No growth to date.    Culture - Body Fluid with Gram Stain (collected 23 Jul 2021 17:00)  Source: .Body Fluid Knee Fluid  Gram Stain (24 Jul 2021 02:37):    polymorphonuclear leukocytes seen    No organisms seen    by cytocentrifuge  Preliminary Report (24 Jul 2021 17:57):    No growth    RADIOLOGY & ADDITIONAL TESTS:    Care Discussed with Consultants/Other Providers:

## 2021-07-26 NOTE — PROGRESS NOTE ADULT - SUBJECTIVE AND OBJECTIVE BOX
CC: f/u for possible infected Rt prosthetic knee    Patient reports: no complaints today.Knee pain improved, no HEENT complaints    REVIEW OF SYSTEMS:  All other review of systems negative (Comprehensive ROS)    Antimicrobials Day #  :off    Other Medications Reviewed  MEDICATIONS  (STANDING):  amLODIPine   Tablet 10 milliGRAM(s) Oral daily  ATENolol  Tablet 50 milliGRAM(s) Oral daily  enoxaparin Injectable 40 milliGRAM(s) SubCutaneous daily  hydrochlorothiazide 25 milliGRAM(s) Oral daily  pantoprazole    Tablet 40 milliGRAM(s) Oral two times a day  senna 2 Tablet(s) Oral at bedtime    T(F): 97.6 (07-26-21 @ 06:10), Max: 97.9 (07-25-21 @ 15:45)  HR: 61 (07-26-21 @ 06:10)  BP: 134/79 (07-26-21 @ 06:10)  RR: 15 (07-26-21 @ 06:10)  SpO2: 96% (07-26-21 @ 06:10)  Wt(kg): --    PHYSICAL EXAM:  General: alert, no acute distress  Eyes:  anicteric, no conjunctival injection, no discharge  Oropharynx: no lesions or injection 	  Neck: supple, without adenopathy  Lungs: clear to auscultation  Heart: regular rate and rhythm; no murmur, rubs or gallops  Abdomen: soft, nondistended, nontender, without mass or organomegaly  Skin: no lesions  Extremities: no clubbing, cyanosis, or edema  Neurologic: alert, oriented, moves all extremities    LAB RESULTS:                        15.3   7.89  )-----------( 282      ( 26 Jul 2021 06:30 )             45.8     07-26    140  |  103  |  24<H>  ----------------------------<  78  4.0   |  31  |  0.82    Ca    9.0      26 Jul 2021 06:30  Phos  3.2     07-26  Mg     2.1     07-26    TPro  6.6  /  Alb  3.2<L>  /  TBili  0.2  /  DBili  x   /  AST  16  /  ALT  30  /  AlkPhos  76  07-26    LIVER FUNCTIONS - ( 26 Jul 2021 06:30 )  Alb: 3.2 g/dL / Pro: 6.6 g/dL / ALK PHOS: 76 U/L / ALT: 30 U/L / AST: 16 U/L / GGT: x             MICROBIOLOGY:  RECENT CULTURES:  07-24 @ 15:15 .Blood Blood-Peripheral     No growth to date.      07-23 @ 17:00 .Body Fluid Knee Fluid     No growth    polymorphonuclear leukocytes seen  No organisms seen  by cytocentrifuge    ESR 16, CRP 25, RF less than 10    RADIOLOGY REVIEWED:    < from: Xray Knee 3 Views, Bilateral (07.23.21 @ 03:09) >  IMPRESSION:  No fracture or dislocation.  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  MODERATE-LARGE RIGHT SUPRAPATELLAR JOINT EFFUSION.    < end of copied text >

## 2021-07-26 NOTE — CONSULT NOTE ADULT - PROBLEM SELECTOR RECOMMENDATION 9
- Left ear pain has significantly improved as per patient.  - left ear nontender and reports that her hearing has never changed  - Biaxin discontinued by primary team which is fine  - Both ear canals, though have minimal cerumen, show no erythema discharge or tenderness.  - May discontinue Ofloxacin  - Analgesia for pain  - Follow up with us after discharge if discomfort persists.
I advised an aspiration of the right knee for diagnostic purposes.  Risks and benefits of aspiration discussed and informed consent obtained.  Under sterile precautions 50cc of cloudy yellow fluid was aspirated from the right knee.  Fluid was sent for culture, gram stain, cell count and crystal analysis.  Ice packs to right knee  F/u lab results.

## 2021-07-26 NOTE — CONSULT NOTE ADULT - SUBJECTIVE AND OBJECTIVE BOX
Patient is a 51y old  Female who presents with a chief complaint of bl knee pain (2021 07:25)      HPI:  51F hx of HTN, HLD, s/p bl TKR 2006 pw L ear pain of about 2 days duration and had seen Urgent care 2 days ago and prescribed Biaxin 500mg bid and had 3 doses and ofloxacin ear drops was added yesterday and later developed acute onset of bl knee pain with swelling and unable to bend the knee sec to significant pain and swelling. Denied any other peripheral joint involvement or axial sxs. Denied trauma. Denied any fevers, chills, cough, recent diarrheal illness, oral ulcers, eye inflammation, dysuria. No hx of crystal arthropathies, uric acid nephrolithiasis, autoimmune arthritis, psoriasis. In ED afebrile P: 97 BP: 151/90 sat 95-98% on RA. WBC: 13.3 K: 3.4 ESR Pending. s/p Decadron, ketorolac, Percocet in ED with still sig pain and unable to ambulate.  (2021 06:22)      Interval Events:  Called to evaluate patient with complaints of having an ear infection since last week.  She reports that last Wednesday evening, she had discomfort in her left ear.  She was able to go to work the following day but reported that she visited an Urgent Center.  There, she was told that her ear looked "gray" and was prescribed Biaxin and Ofloxacin of which she has had minimal relief with.  That evening, she reported that she was unable to get out of bed and had bilateral knee pain.  Her  then brought her to the ER for evaluation.  Today, she reports that she still has mild discomfort of the left ear, normal hearing and no longer hears any "cracking" noises.  Since admission, her Biaxin was discontinued.    PAST MEDICAL & SURGICAL HISTORY:  Hypertension  Hyperlipidemia  Gastroesophageal reflux disease  History of total knee replacement, bilateral  H/O:  x4  History of partial hysterectomy    Allergies  penicillin (Rash (Mild))    Intolerances      Social History:  denied tob, ETOH or illicit drug use. (2021 06:22)      REVIEW OF SYSTEMS:     CONSTITUTIONAL: No weakness, fevers or chills     EYES/ENT: No visual changes;  No vertigo or throat pain, mild left ear discomfort but improved     NECK: No pain or stiffness     RESPIRATORY: No cough, wheezing, hemoptysis; No shortness of breath     CARDIOVASCULAR: No chest pain or palpitations     GASTROINTESTINAL: No abdominal or epigastric pain. No nausea, vomiting, or hematemesis; No diarrhea or constipation. No melena or hematochezia.     GENITOURINARY: No dysuria, frequency or hematuria     NEUROLOGICAL: No numbness or weakness     SKIN: No itching, burning, rashes, or lesions   All other review of systems is negative unless indicated above.    MEDICATIONS  (STANDING):  amLODIPine   Tablet 10 milliGRAM(s) Oral daily  ATENolol  Tablet 50 milliGRAM(s) Oral daily  enoxaparin Injectable 40 milliGRAM(s) SubCutaneous daily  hydrochlorothiazide 25 milliGRAM(s) Oral daily  ofloxacin 0.3% Solution 10 Drop(s) Left Ear daily  pantoprazole    Tablet 40 milliGRAM(s) Oral two times a day  senna 2 Tablet(s) Oral at bedtime    MEDICATIONS  (PRN):  acetaminophen   Tablet .. 650 milliGRAM(s) Oral every 6 hours PRN Temp greater or equal to 38C (100.4F), Mild Pain (1 - 3)  oxycodone    5 mG/acetaminophen 325 mG 1 Tablet(s) Oral every 6 hours PRN Severe Pain (7 - 10)  traMADol 50 milliGRAM(s) Oral every 6 hours PRN Moderate Pain (4 - 6)                            15.3   7.89  )-----------( 282      ( 2021 06:30 )             45.8         140  |  103  |  24<H>  ----------------------------<  78  4.0   |  31  |  0.82    Ca    9.0      2021 06:30  Phos  3.2       Mg     2.1         TPro  6.6  /  Alb  3.2<L>  /  TBili  0.2  /  DBili  x   /  AST  16  /  ALT  30  /  AlkPhos  76      I&O's Detail    2021 07:01  -  2021 07:00  --------------------------------------------------------  IN:    Oral Fluid: 1000 mL  Total IN: 1000 mL    OUT:  Total OUT: 0 mL    Total NET: 1000 mL        Vital Signs Last 24 Hrs  T(C): 36.4 (2021 06:10), Max: 36.6 (2021 15:45)  T(F): 97.6 (2021 06:10), Max: 97.9 (2021 15:45)  HR: 61 (2021 06:10) (55 - 61)  BP: 134/79 (2021 06:10) (93/74 - 134/79)  BP(mean): --  RR: 15 (2021 06:10) (15 - 17)  SpO2: 96% (2021 06:10) (95% - 96%)  CBC Full  -  ( 2021 06:30 )  WBC Count : 7.89 K/uL  RBC Count : 5.09 M/uL  Hemoglobin : 15.3 g/dL  Hematocrit : 45.8 %  Platelet Count - Automated : 282 K/uL  Mean Cell Volume : 90.0 fl  Mean Cell Hemoglobin : 30.1 pg  Mean Cell Hemoglobin Concentration : 33.4 gm/dL      PHYSICAL EXAM:     Constitutional Normal: well nourished, well developed, non-dysmorphic, no acute distress     Psychiatric: age appropriate behavior, cooperative     Skin: no obvious skin lesions     Lymphatic: no cervical lymphadenopathy     ENT:        Left EAC: Normal, minimal cerumen, no exostoses         Right EAC: Normal, minimal cerumen, no exostoses           Left Tympanic Membrane: Normal, Clear, No effusion        Right Tympanic Membrane: Normal, Clear, No effusion			          External Nose:  Normal, no structural deformities		     Oral Cavity:  Good dentition, tongue midline, no lesions or ulcerations, uvula midline     Neck: No palpable lymphadenopathy     Pulmonary: No Acute Distress.      Neurologic: awake and alert

## 2021-07-26 NOTE — PROGRESS NOTE ADULT - ASSESSMENT
51y past medical history of HTN, HLD, s/p bl TKR 2006 pw L ear pain of about 2 days duration and had seen Urgent care 2 days ago and prescribed Biaxin 500mg bid and had 3 doses and ofloxacin ear drops was added yesterday and later developed acute onset of bilateral knee pain with swelling and unable to bend the right  knee secondary to significant pain and swelling. Denied trauma. Denied any fevers, chills, cough. The patient admitted to [facility] she was seen by ortho and she underwent right knee arthrocentesis and joint fluid sent for analysis and she was noted to have elevated wbc 46K, fluid sent for culture in process. She was continued on antibiotics she was given oral Ceftin and otic gtt. ID consulted for further antibiotics management.   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  ddx includes gout and pseudogout and inflammatory arthritis.  The patient reported she took a total of three dose of clarithromycin hopefully being on antibiotics will not influence bacterial cx and effect cx results  It is not complete clear if does have a true ear infection, she reports she was seen by a PA at urgent care center and made dx. t   she is non toxic appearing, she is afebrile , vitals are stable   Leukocytosis noted but she is getting steroids here which will can cause an increase in the white cells  She is also being prescribe ofloxacin ear gtts   Joint and blood cultures remain negative.  Rheumatology to assess.  No plans for joint washout at this point.  CRP is elevated at 25, ESR is 16 and RF is less than 10  Her knee pain and swelling are improved and her leukocytosis has resolved.    Plan   1.Monitor off antibiotics  2.Agree with Ortho plans, await rheum input  3.Joint culture is negative at 72 hours, perhaps the best plan at this point will be conservative f/u

## 2021-07-27 ENCOUNTER — TRANSCRIPTION ENCOUNTER (OUTPATIENT)
Age: 51
End: 2021-07-27

## 2021-07-27 VITALS
HEART RATE: 55 BPM | TEMPERATURE: 97 F | DIASTOLIC BLOOD PRESSURE: 80 MMHG | OXYGEN SATURATION: 96 % | SYSTOLIC BLOOD PRESSURE: 128 MMHG | RESPIRATION RATE: 16 BRPM

## 2021-07-27 LAB
ANION GAP SERPL CALC-SCNC: 3 MMOL/L — LOW (ref 5–17)
BUN SERPL-MCNC: 21 MG/DL — SIGNIFICANT CHANGE UP (ref 7–23)
CALCIUM SERPL-MCNC: 9.2 MG/DL — SIGNIFICANT CHANGE UP (ref 8.4–10.5)
CHLORIDE SERPL-SCNC: 101 MMOL/L — SIGNIFICANT CHANGE UP (ref 96–108)
CO2 SERPL-SCNC: 35 MMOL/L — HIGH (ref 22–31)
CREAT SERPL-MCNC: 0.97 MG/DL — SIGNIFICANT CHANGE UP (ref 0.5–1.3)
GLUCOSE SERPL-MCNC: 82 MG/DL — SIGNIFICANT CHANGE UP (ref 70–99)
HCT VFR BLD CALC: 47.4 % — HIGH (ref 34.5–45)
HGB BLD-MCNC: 15.6 G/DL — HIGH (ref 11.5–15.5)
MCHC RBC-ENTMCNC: 29.8 PG — SIGNIFICANT CHANGE UP (ref 27–34)
MCHC RBC-ENTMCNC: 32.9 GM/DL — SIGNIFICANT CHANGE UP (ref 32–36)
MCV RBC AUTO: 90.6 FL — SIGNIFICANT CHANGE UP (ref 80–100)
NRBC # BLD: 0 /100 WBCS — SIGNIFICANT CHANGE UP (ref 0–0)
PLATELET # BLD AUTO: 292 K/UL — SIGNIFICANT CHANGE UP (ref 150–400)
POTASSIUM SERPL-MCNC: 4.5 MMOL/L — SIGNIFICANT CHANGE UP (ref 3.5–5.3)
POTASSIUM SERPL-SCNC: 4.5 MMOL/L — SIGNIFICANT CHANGE UP (ref 3.5–5.3)
RBC # BLD: 5.23 M/UL — HIGH (ref 3.8–5.2)
RBC # FLD: 12.4 % — SIGNIFICANT CHANGE UP (ref 10.3–14.5)
SODIUM SERPL-SCNC: 139 MMOL/L — SIGNIFICANT CHANGE UP (ref 135–145)
WBC # BLD: 6.94 K/UL — SIGNIFICANT CHANGE UP (ref 3.8–10.5)
WBC # FLD AUTO: 6.94 K/UL — SIGNIFICANT CHANGE UP (ref 3.8–10.5)

## 2021-07-27 PROCEDURE — 85652 RBC SED RATE AUTOMATED: CPT

## 2021-07-27 PROCEDURE — 86769 SARS-COV-2 COVID-19 ANTIBODY: CPT

## 2021-07-27 PROCEDURE — 97162 PT EVAL MOD COMPLEX 30 MIN: CPT

## 2021-07-27 PROCEDURE — 97116 GAIT TRAINING THERAPY: CPT

## 2021-07-27 PROCEDURE — 84145 PROCALCITONIN (PCT): CPT

## 2021-07-27 PROCEDURE — 85025 COMPLETE CBC W/AUTO DIFF WBC: CPT

## 2021-07-27 PROCEDURE — 89051 BODY FLUID CELL COUNT: CPT

## 2021-07-27 PROCEDURE — 73620 X-RAY EXAM OF FOOT: CPT

## 2021-07-27 PROCEDURE — 82550 ASSAY OF CK (CPK): CPT

## 2021-07-27 PROCEDURE — 87205 SMEAR GRAM STAIN: CPT

## 2021-07-27 PROCEDURE — 84100 ASSAY OF PHOSPHORUS: CPT

## 2021-07-27 PROCEDURE — 87070 CULTURE OTHR SPECIMN AEROBIC: CPT

## 2021-07-27 PROCEDURE — 73562 X-RAY EXAM OF KNEE 3: CPT

## 2021-07-27 PROCEDURE — 82310 ASSAY OF CALCIUM: CPT

## 2021-07-27 PROCEDURE — 80053 COMPREHEN METABOLIC PANEL: CPT

## 2021-07-27 PROCEDURE — 36415 COLL VENOUS BLD VENIPUNCTURE: CPT

## 2021-07-27 PROCEDURE — 85027 COMPLETE CBC AUTOMATED: CPT

## 2021-07-27 PROCEDURE — 99285 EMERGENCY DEPT VISIT HI MDM: CPT | Mod: 25

## 2021-07-27 PROCEDURE — 87075 CULTR BACTERIA EXCEPT BLOOD: CPT

## 2021-07-27 PROCEDURE — 73120 X-RAY EXAM OF HAND: CPT

## 2021-07-27 PROCEDURE — 87637 SARSCOV2&INF A&B&RSV AMP PRB: CPT

## 2021-07-27 PROCEDURE — 96365 THER/PROPH/DIAG IV INF INIT: CPT

## 2021-07-27 PROCEDURE — 73120 X-RAY EXAM OF HAND: CPT | Mod: 26,50

## 2021-07-27 PROCEDURE — 93970 EXTREMITY STUDY: CPT

## 2021-07-27 PROCEDURE — 86140 C-REACTIVE PROTEIN: CPT

## 2021-07-27 PROCEDURE — 99222 1ST HOSP IP/OBS MODERATE 55: CPT

## 2021-07-27 PROCEDURE — 80048 BASIC METABOLIC PNL TOTAL CA: CPT

## 2021-07-27 PROCEDURE — 99239 HOSP IP/OBS DSCHRG MGMT >30: CPT

## 2021-07-27 PROCEDURE — 87040 BLOOD CULTURE FOR BACTERIA: CPT

## 2021-07-27 PROCEDURE — 84443 ASSAY THYROID STIM HORMONE: CPT

## 2021-07-27 PROCEDURE — 86200 CCP ANTIBODY: CPT

## 2021-07-27 PROCEDURE — 73620 X-RAY EXAM OF FOOT: CPT | Mod: 26,50

## 2021-07-27 PROCEDURE — 83735 ASSAY OF MAGNESIUM: CPT

## 2021-07-27 PROCEDURE — 83970 ASSAY OF PARATHORMONE: CPT

## 2021-07-27 PROCEDURE — 86431 RHEUMATOID FACTOR QUANT: CPT

## 2021-07-27 PROCEDURE — 96375 TX/PRO/DX INJ NEW DRUG ADDON: CPT

## 2021-07-27 PROCEDURE — 89060 EXAM SYNOVIAL FLUID CRYSTALS: CPT

## 2021-07-27 PROCEDURE — 84550 ASSAY OF BLOOD/URIC ACID: CPT

## 2021-07-27 RX ORDER — CLARITHROMYCIN 500 MG
1 TABLET ORAL
Qty: 0 | Refills: 0 | DISCHARGE

## 2021-07-27 RX ADMIN — ATENOLOL 50 MILLIGRAM(S): 25 TABLET ORAL at 06:30

## 2021-07-27 RX ADMIN — AMLODIPINE BESYLATE 10 MILLIGRAM(S): 2.5 TABLET ORAL at 06:30

## 2021-07-27 RX ADMIN — PANTOPRAZOLE SODIUM 40 MILLIGRAM(S): 20 TABLET, DELAYED RELEASE ORAL at 06:30

## 2021-07-27 RX ADMIN — TRAMADOL HYDROCHLORIDE 50 MILLIGRAM(S): 50 TABLET ORAL at 11:45

## 2021-07-27 RX ADMIN — TRAMADOL HYDROCHLORIDE 50 MILLIGRAM(S): 50 TABLET ORAL at 10:45

## 2021-07-27 RX ADMIN — Medication 25 MILLIGRAM(S): at 06:30

## 2021-07-27 NOTE — PROGRESS NOTE ADULT - ASSESSMENT
51F hx of HTN, HLD, s/p bl TKR 2006, pw L ear pain on biaxin with acute onset of bl knee pain.     #Bilateral knee pain with effusions, in bilateral replaced knees 2006 - suspect prosthetic joint infection  - Symptoms improving  - Right knee aspirate fluid analysis showed elevated white cells  - Crystal arthropathies r/o, uric acid WNL. Less likely reactive arthritis/autoimmune arthritis.  - Bilateral LE dopplers are negative for DVT  - Currently off steroids, off all antibiotics  - Follow up knee fluid culture-NGTD  - Follow up blood cultures -NGTD  - ID consulted  - Orthopedics consulted  - Rheumatology consult pending  - Eventual home with outpatient PT    #Ear infection  - Ceftin DC'd,  - ENT consulted--no infection--off abx     #HTN  - Continue amlodipine, atenolol, HCTZ    #GERD  - Continue protonix    #Leukocytosis:  resolved  - Likely due to steroids; r/o septic joint  - Monitor for now    #CKD II  - Avoid nephrotoxic medications  - Monitor BMP    #DVT ppx   - Lovenox    Dispo: Home with outpatient PT once cleared by rheum, ortho and ID      Pat [phone]

## 2021-07-27 NOTE — CONSULT NOTE ADULT - ASSESSMENT
51F hx of HTN, HLD, s/p bl TKR 2006     INCOMPLETE  51F with hx of HTN, HLD, s/p bl TKR 2006 due to unclear etiology of advanced degenerative arthritis at an early age without evidence of other advanced OA changes, admitted with acute onset b/l knee pain, swelling, effusion, and inability to bear weight. S/p R knee arthrocentesis with 46K WBCs, no crystals, gram stain/cx/BCx negative but in setting of + ABx and with 2 prior ?infections -- tooth followed by ear. Imaging with prostheses in place. Full resolution approx 4 days after admission, now back to baseline. Episode of similar sx in b/l ankles and feet a few months ago, self resolved without treatment but no episodes prior to that. Some bony changes over DIPs but otherwise with paucity of inflammatory arthritis or CTD sx by history and physical. Etiology unclear at present but crystalline arthritis, moreso pseudogout remains on the ddx as those crystals can be fleeting. Unlikely gout with negative MSU in synovial fluid. Synovial WBC level could be seen infection, inflammatory or crystalline effusions but too high for a bland effusion.     - will follow-up [facility] and will add on ferritin level and iron studies due to accelerated OA in her knees to evaluate for hemochromatosis   - check XR b/l hands and feet to evaluate for erosive or inflammatory changes -- if can't be done while admitted, can be done as OP  - please send her with a prednisone taper in case of recurrence of sx -- prednisone 15mg x 3d, 10mg x 3d, 5mg x 3 days then stop. Pt was instructed to take with food, and only start if acute onset pain/swelling which has not resolved after 24 hours and there is no associated fever or infectious symptoms.   - she can follow-up with me in 1 month. Phone for appointment - [phone], option 8. Address -- [address] [place] [address]

## 2021-07-27 NOTE — DISCHARGE NOTE PROVIDER - NSDCMRMEDTOKEN_GEN_ALL_CORE_FT
amLODIPine 10 mg oral tablet: 1 tab(s) orally once a day  atenolol 50 mg oral tablet: 1 tab(s) orally once a day  atorvastatin 10 mg oral tablet: 1 tab(s) orally once a day    **told to hold while taking clarithromycin per PMD  clarithromycin 500 mg oral tablet: 1 tab(s) orally every 12 hours  hydroCHLOROthiazide 25 mg oral tablet: 1 tab(s) orally once a day  pantoprazole 40 mg oral delayed release tablet: 1 tab(s) orally 2 times a day   amLODIPine 10 mg oral tablet: 1 tab(s) orally once a day  atenolol 50 mg oral tablet: 1 tab(s) orally once a day  atorvastatin 10 mg oral tablet: 1 tab(s) orally once a day    **told to hold while taking clarithromycin per PMD  hydroCHLOROthiazide 25 mg oral tablet: 1 tab(s) orally once a day  outpatient PT : Outpatient PT  Working dx: unsteady gait  knee pain  pantoprazole 40 mg oral delayed release tablet: 1 tab(s) orally 2 times a day  predniSONE 5 mg oral tablet: 3 tab(s) orally once a day x 3 days  2 tab(s) orally once a day x 3 days  1 tab(s) orally once a day x 3 days   only start if acute onset pain/swelling which has not resolved after 24 hours and there is no associated fever or infectious symptoms.    amLODIPine 10 mg oral tablet: 1 tab(s) orally once a day  atenolol 50 mg oral tablet: 1 tab(s) orally once a day  atorvastatin 10 mg oral tablet: 1 tab(s) orally once a day    **told to hold while taking clarithromycin per PMD  hydroCHLOROthiazide 25 mg oral tablet: 1 tab(s) orally once a day  outpatient PT : Outpatient PT  Working dx: unsteady gait  knee pain  oxycodone-acetaminophen 5 mg-300 mg oral tablet: 1 tab(s) orally every 8 hours, As Needed MDD:3   pantoprazole 40 mg oral delayed release tablet: 1 tab(s) orally 2 times a day  predniSONE 5 mg oral tablet: 3 tab(s) orally once a day x 3 days  2 tab(s) orally once a day x 3 days  1 tab(s) orally once a day x 3 days   only start if acute onset pain/swelling which has not resolved after 24 hours and there is no associated fever or infectious symptoms.

## 2021-07-27 NOTE — DISCHARGE NOTE PROVIDER - CARE PROVIDERS DIRECT ADDRESSES
[email or web address]

## 2021-07-27 NOTE — DISCHARGE NOTE PROVIDER - NSDCFUADDAPPT_GEN_ALL_CORE_FT
Follow up appointment with Dr. Rosenberg 7/29 at 1PM  Follow up appointment with Dr. Rosenberg 7/29 at 1PM     Please call Dr. Menjivar who is the revision Total Knee Replacement Orthopedic physician and make appointment for follow up.    Please make appointment with Dr. Joyce from rheumatology

## 2021-07-27 NOTE — PROGRESS NOTE ADULT - ATTENDING COMMENTS
Ortho for F/U. rheum, ID and ENT on board. D/C planning if cleared by ortho/ID
Patient seen and examined at the bedside.  Agree with above.  Clinically her knee does not appear to be septic although her lab values are very suspicious for PJI.  Will f/u culture results and also advise formal rheumatology consult to rule out inflammatory etiology.
Less swelling noted in B/L knees. No abx or steroids since the 24th. Ofloxacin stopped yesterday. Discussed with ENT, ID and rheum. Possible D/C planning today
trend WBC, f/u cultures and ortho. Abx as per ID if needed. ENT eval pending

## 2021-07-27 NOTE — PROGRESS NOTE ADULT - SUBJECTIVE AND OBJECTIVE BOX
CC: f/u for right knee pain and swelling, which is now much improved     Patient reports reports she is feeling better, knees pain and edema is much improved     REVIEW OF SYSTEMS:  All other review of systems negative (Comprehensive ROS)          Vital Signs Last 24 Hrs  T(C): 36.6 (27 Jul 2021 06:00), Max: 36.6 (27 Jul 2021 06:00)  T(F): 97.8 (27 Jul 2021 06:00), Max: 97.8 (27 Jul 2021 06:00)  HR: 61 (27 Jul 2021 06:00) (57 - 64)  BP: 133/77 (27 Jul 2021 06:00) (130/82 - 136/84)  BP(mean): --  RR: 20 (27 Jul 2021 06:00) (17 - 20)  SpO2: 96% (27 Jul 2021 06:00) (95% - 98%)-    PHYSICAL EXAM:  General: alert, no acute distress  Eyes:  anicteric, no conjunctival injection, no discharge  Oropharynx: no lesions or injection 	  Neck: supple, without adenopathy  Lungs: clear to auscultation  Heart: regular rate and rhythm; no murmur, rubs or gallops  Abdomen: soft, nondistended, nontender, without mass or organomegaly  Skin: no lesions  Extremities: no clubbing, cyanosis, or edema  Neurologic: alert, oriented, moves all extremities    LAB RESULTS:                                   15.6   6.94  )-----------( 292      ( 27 Jul 2021 05:19 )             47.4     07-27    139  |  101  |  21  ----------------------------<  82  4.5   |  35<H>  |  0.97    Ca    9.2      27 Jul 2021 05:19  Phos  3.2     07-26  Mg     2.1     07-26    TPro  6.6  /  Alb  3.2<L>  /  TBili  0.2  /  DBili  x   /  AST  16  /  ALT  30  /  AlkPhos  76  07-26              MICROBIOLOGY:  RECENT CULTURES:  07-24 @ 15:15 .Blood Blood-Peripheral     No growth to date.      07-23 @ 17:00 .Body Fluid Knee Fluid     No growth    polymorphonuclear leukocytes seen  No organisms seen  by cytocentrifuge    ESR 16, CRP 25, RF less than 10    RADIOLOGY REVIEWED:    < from: Xray Knee 3 Views, Bilateral (07.23.21 @ 03:09) >  IMPRESSION:  No fracture or dislocation.  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  MODERATE-LARGE RIGHT SUPRAPATELLAR JOINT EFFUSION.    < end of copied text >

## 2021-07-27 NOTE — PROGRESS NOTE ADULT - PROVIDER SPECIALTY LIST ADULT
Infectious Disease
Hospitalist
Hospitalist
Orthopedics
Infectious Disease
Infectious Disease
Orthopedics
Hospitalist
Hospitalist

## 2021-07-27 NOTE — DISCHARGE NOTE PROVIDER - NSDCCPCAREPLAN_GEN_ALL_CORE_FT
PRINCIPAL DISCHARGE DIAGNOSIS  Diagnosis: Bilateral knee pain  Assessment and Plan of Treatment: Orthopedic consulted and performed arthrocentesis (procedure to remove excess fluid from the knee). Fluid culture was sent to the lab and was negative for infection  Dr Joyce from rheumatology consulted and feels your symptoms are due to gout (disease in hich defective metabolism of uric acid cuases arthritis) our possible psuedogout (calcium depost in the joints csausing pain). She will like to see you in one month         SECONDARY DISCHARGE DIAGNOSES  Diagnosis: Unable to ambulate  Assessment and Plan of Treatment:      PRINCIPAL DISCHARGE DIAGNOSIS  Diagnosis: Bilateral knee pain  Assessment and Plan of Treatment: Orthopedic consulted and performed arthrocentesis (procedure to remove excess fluid from the knee). Fluid culture was sent to the lab and was negative for infection  Dr Joyce from rheumatology consulted and feels your symptoms are due to gout (disease where there are uric acid depositis in the joints causing arthritis) or possible pseudogout (which is calcium deposit in the joints causing pain). She will like to see you in one month         SECONDARY DISCHARGE DIAGNOSES  Diagnosis: Unable to ambulate  Assessment and Plan of Treatment: due to swelling in both knees likely from above

## 2021-07-27 NOTE — DISCHARGE NOTE PROVIDER - PROVIDER TOKENS
PROVIDER:[TOKEN:[[id number]]] PROVIDER:[TOKEN:[[id number]]],PROVIDER:[TOKEN:[[id number]]]

## 2021-07-27 NOTE — DISCHARGE NOTE PROVIDER - HOSPITAL COURSE
: 51F hx of HTN, HLD, s/p bl TKR 2006 pw L ear pain of about 2 days duration and had seen Urgent care 2 days ago and prescribed Biaxin 500mg bid and had 3 doses and ofloxacin ear drops was added yesterday and later developed acute onset of bl knee pain with swelling and unable to bend the knee secondary to significant pain and swelling. Denied any other peripheral joint involvement or axial sxs. Denied trauma. Denied any fevers, chills, cough, recent diarrheal illness, oral ulcers, eye inflammation, dysuria. No hx of crystal arthropathies, uric acid nephrolithiasis, autoimmune arthritis, psoriasis. In ED afebrile P: 97 BP: 151/90 sat 95-98% on RA. WBC: 13.3 K: 3.4 ESR Pending. s/p Decadron, ketorolac, Percocet in ED with still sig pain and unable to ambulate.   Admitted to hospitalist. Ortho consulted. performed arthrocentesis on 7/23.   right knee arthrocentesis and joint fluid sent for analysis and she was noted to have elevated wbc 46K, fluid sent for culture. ID consulted .  Patient was initially on abx but then they were discontinued as per ID recs. ID felt leukocytosis may be due to steroids. Patient remained afebrile and nontoxic appearing. Mobility improved after arthrocentesis.  Fluid culture with NGTD from 7/23.  Blood cx negative.   Rheumatology consulted and felt this is gout vs psuedogout. Xrays hands and feet revealed______  Patient to gollow up with rheum at the end of month       **RESULTS**     : 51F hx of HTN, HLD, s/p bl TKR 2006 pw L ear pain of about 2 days duration and had seen Urgent care 2 days ago and prescribed Biaxin 500mg bid and had 3 doses and ofloxacin ear drops was added yesterday and later developed acute onset of bl knee pain with swelling and unable to bend the knee secondary to significant pain and swelling.  Admitted to hospitalist. Ortho consulted. performed arthrocentesis on 7/23.   right knee arthrocentesis and joint fluid sent for analysis and she was noted to have elevated wbc 46K, fluid sent for culture. ID consulted .  Patient was initially on abx but then they were discontinued as per ID recs. ID felt leukocytosis may be due to steroids. Patient remained afebrile and nontoxic appearing. Mobility improved after arthrocentesis.  Fluid culture with NGTD from 7/23.  Blood cx negative.  CRP is elevated at 25, ESR is 16 and RF is less than 10. WBC downtrended to normal.     Rheumatology consulted and felt this is gout vs psuedogout. Xrays hands and feet revealed______  Patient to follow up with rheum at the end of month     PT rec outpatient PT      **RESULTS**     : 51F hx of HTN, HLD, s/p bl TKR 2006 pw L ear pain of about 2 days duration and had seen Urgent care 2 days ago and prescribed Biaxin 500mg bid and had 3 doses and ofloxacin ear drops was added yesterday and later developed acute onset of bl knee pain with swelling and unable to bend the knee secondary to significant pain and swelling.  Admitted to hospitalist. Ortho consulted. performed arthrocentesis on 7/23.   right knee arthrocentesis and joint fluid sent for analysis and she was noted to have elevated wbc 46K, fluid sent for culture. ID consulted .  Patient was initially on abx but then they were discontinued as per ID recs. ID felt leukocytosis may be due to steroids. Patient remained afebrile and nontoxic appearing. Mobility improved after arthrocentesis.  Fluid culture with NGTD from 7/23.  Blood cx negative.  CRP is elevated at 25, ESR is 16 and RF is less than 10. WBC downtrended to normal.     Rheumatology consulted and felt this is gout vs psuedogout. Xrays hands and feet ordered.    Patient to follow up with rheum at the end of month     PT rec outpatient PT           : 51F hx of HTN, HLD, s/p bl TKR 2006 pw L ear pain of about 2 days duration and had seen Urgent care 2 days ago and prescribed Biaxin 500mg bid and had 3 doses and ofloxacin ear drops was added yesterday and later developed acute onset of bl knee pain with swelling and unable to bend the knee secondary to significant pain and swelling.  Admitted to hospitalist. Ortho consulted. performed arthrocentesis on 7/23.   right knee arthrocentesis and joint fluid sent for analysis and she was noted to have elevated wbc 46K, fluid sent for culture. ID consulted .  Patient was initially on abx but then they were discontinued as per ID recs. ID felt leukocytosis may be due to steroids. Patient remained afebrile and nontoxic appearing. Mobility improved after arthrocentesis.  Fluid culture with NGTD from 7/23.  Blood cx negative.  CRP is elevated at 25, ESR is 16 and RF is less than 10. WBC downtrended to normal.     Rheumatology consulted and felt this is gout vs psuedogout. Xrays hands and feet ordered ambrosio Joyce will follow up as outpatient.    Discharged home with prednisone taper in case of recurrence of sx -- prednisone 15mg x 3d, 10mg x 3d, 5mg x 3 days then stop. Pt was instructed to take with food, and only start if acute onset pain/swelling which has not resolved after 24 hours and there is no associated fever or infectious symptoms.       Patient to follow up with rheum at the end of month     PT rec outpatient PT

## 2021-07-27 NOTE — PROGRESS NOTE ADULT - REASON FOR ADMISSION
Bilateral knee pain, Right knee effusion
bl knee pain

## 2021-07-27 NOTE — PROGRESS NOTE ADULT - ASSESSMENT
51y past medical history of HTN, HLD, s/p bl TKR 2006 pw L ear pain of about 2 days duration and had seen Urgent care 2 days ago and prescribed Biaxin 500mg bid and had 3 doses and ofloxacin ear drops was added yesterday and later developed acute onset of bilateral knee pain with swelling and unable to bend the right  knee secondary to significant pain and swelling. Denied trauma. Denied any fevers, chills, cough. The patient admitted to [facility] she was seen by ortho and she underwent right knee arthrocentesis and joint fluid sent for analysis and she was noted to have elevated wbc 46K, fluid sent for culture in process. She was continued on antibiotics she was given oral Ceftin and otic gtt. ID consulted for further antibiotics management.   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  ddx includes gout and pseudogout and inflammatory arthritis.  The patient reported she took a total of three dose of clarithromycin hopefully being on antibiotics will not influence bacterial cx and effect cx results  It is not complete clear if does have a true ear infection, she reports she was seen by a PA at urgent care center and made dx. t     Leukocytosis noted but she is getting steroids here which will can cause an increase in the white cells  Joint and blood cultures remain negative.  CRP is elevated at 25, ESR is 16 and RF is less than 10  Her knee pain and swelling are improved and her leukocytosis has resolved.  reviewed notes in the chart gout vs pseudogout is also in the ddx, she was recommended to receive steroids   fluid cx remain no growth, at this point low suspicion of joint infection, given cx remain neg and she her knees pain and edema is now improved     Plan   continue supportive care per medicine   appreciated rheumatology input, pt reports that her knees are feeling better, edema is better   from ID point of view no objection for DC planning reviewed case with Dr Cruz   pt to follow up with outpatient rheumatology and orthopedics.

## 2021-07-27 NOTE — PROGRESS NOTE ADULT - SUBJECTIVE AND OBJECTIVE BOX
Patient is a 51y old  Female who presents with a chief complaint of bl knee pain (26 Jul 2021 15:07)  Feeling improvement today.  No acute events   Eager to go home  Ambulating around the room     Patient seen and examined at bedside.    ALLERGIES:  penicillin (Rash (Mild))    MEDICATIONS  (STANDING):  amLODIPine   Tablet 10 milliGRAM(s) Oral daily  ATENolol  Tablet 50 milliGRAM(s) Oral daily  enoxaparin Injectable 40 milliGRAM(s) SubCutaneous daily  hydrochlorothiazide 25 milliGRAM(s) Oral daily  pantoprazole    Tablet 40 milliGRAM(s) Oral two times a day  senna 2 Tablet(s) Oral at bedtime    MEDICATIONS  (PRN):  acetaminophen   Tablet .. 650 milliGRAM(s) Oral every 6 hours PRN Temp greater or equal to 38C (100.4F), Mild Pain (1 - 3)  oxycodone    5 mG/acetaminophen 325 mG 1 Tablet(s) Oral every 6 hours PRN Severe Pain (7 - 10)  traMADol 50 milliGRAM(s) Oral every 6 hours PRN Moderate Pain (4 - 6)    Vital Signs Last 24 Hrs  T(F): 97.8 (27 Jul 2021 06:00), Max: 97.8 (27 Jul 2021 06:00)  HR: 61 (27 Jul 2021 06:00) (57 - 64)  BP: 133/77 (27 Jul 2021 06:00) (130/82 - 136/84)  RR: 20 (27 Jul 2021 06:00) (17 - 20)  SpO2: 96% (27 Jul 2021 06:00) (95% - 98%)  I&O's Summary    26 Jul 2021 07:01  -  27 Jul 2021 07:00  --------------------------------------------------------  IN: 600 mL / OUT: 0 mL / NET: 600 mL      BMI (kg/m2): 33.9 (07-23-21 @ 02:37)  PHYSICAL EXAM:  General: NAD, A/O x 3  ENT: MMM, no thrush  Neck: Supple, No JVD  Lungs: Non labored breathing,  Clear to auscultation bilaterally,   Cardio: RRR, S1/S2,  no pitting edema bilaterally  Abdomen: Soft, Nontender, Nondistended; Bowel sounds present  Extremities: No calf tenderness, moves all extremities    LABS:                        15.6   6.94  )-----------( 292      ( 27 Jul 2021 05:19 )             47.4       07-27    139  |  101  |  21  ----------------------------<  82  4.5   |  35  |  0.97    Ca    9.2      27 Jul 2021 05:19  Phos  3.2     07-26  Mg     2.1     07-26    TPro  6.6  /  Alb  3.2  /  TBili  0.2  /  DBili  x   /  AST  16  /  ALT  30  /  AlkPhos  76  07-26     eGFR if Non African American: 67 mL/min/1.73M2 (07-27-21 @ 05:19)  eGFR if African American: 78 mL/min/1.73M2 (07-27-21 @ 05:19)               TSH 1.46   TSH with FT4 reflex --  Total T3 --                      Culture - Blood (collected 24 Jul 2021 15:15)  Source: .Blood Blood-Venous  Preliminary Report (25 Jul 2021 22:01):    No growth to date.    Culture - Blood (collected 24 Jul 2021 15:15)  Source: .Blood Blood-Peripheral  Preliminary Report (25 Jul 2021 22:01):    No growth to date.    Culture - Body Fluid with Gram Stain (collected 23 Jul 2021 17:00)  Source: .Body Fluid Knee Fluid  Gram Stain (24 Jul 2021 02:37):    polymorphonuclear leukocytes seen    No organisms seen    by cytocentrifuge  Preliminary Report (24 Jul 2021 17:57):    No growth          RADIOLOGY & ADDITIONAL TESTS:    Care Discussed with Consultants/Other Providers:

## 2021-07-27 NOTE — DISCHARGE NOTE PROVIDER - CARE PROVIDER_API CALL
Barbara Joyce)  Internal Medicine; Rheumatology  [address] [place] [address]  Phone: [phone]  Fax: [phone]  Follow Up Time:    Barbara Joyce)  Internal Medicine; Rheumatology  [address] [place] [address]  Phone: [phone]  Fax: [phone]  Follow Up Time:     Melanie Menjivar)  Orthopaedic Surgery  [address] [facility], [address]  [place] [address]  Phone: [phone]  Fax: [phone]  Follow Up Time:

## 2021-07-27 NOTE — CONSULT NOTE ADULT - SUBJECTIVE AND OBJECTIVE BOX
GEOVANNY KIM  [id number]    HISTORY OF PRESENT ILLNESS: 51F hx of HTN, HLD, s/p bl TKR  pw L ear pain of about 2 days duration and had seen Urgent care 2 days ago and prescribed Biaxin 500mg bid and had 3 doses and ofloxacin ear drops was added yesterday and later developed acute onset of bl knee pain with swelling and unable to bend the knee secondary to significant pain and swelling. Denied any other peripheral joint involvement or axial sxs. Denied trauma. Denied any fevers, chills, cough, recent diarrheal illness, oral ulcers, eye inflammation, dysuria. No hx of crystal arthropathies, uric acid nephrolithiasis, autoimmune arthritis, psoriasis. In ED afebrile P: 97 BP: 151/90 sat 95-98% on RA. WBC: 13.3 K: 3.4 ESR Pending. s/p Decadron, ketorolac, Percocet in ED with still sig pain and unable to ambulate.     Rheumatology consulted for possible inflammatory arthritis as this is the second episode of acute onset joint pain. Reports she was traveling during memorial day weekend, acutely developed b/l foot and ankle pain and swelling, able to bear weight but very painful, seen in UC where only K was a little low, discharged without any meds and symptoms self-resolved within a few days. Then had a L sided tooth infection complicated by an abscess successfully treated with Abx a few weeks prior to her above ear pain and knee presentation. Reports knee pain/swelling developed acutely over the course of a few hours while she was sleeping, intensely painful, unable to bear weight or tolerate sheet on her skin. Took her 4 days in the hospital until she felt back to baseline, currently no issues with bending the knees or ambulating. No other active joint pain at present. Denies prior hx of knee, ankle, toe swelling. No hx of recurrent joint effusions or issues with her prostheses. No FH of crystalline arthritis, no change to meds or her diet prior to onset of these sx. No hx of psoriasis, inflammatory eye disease, IBD (recent colonoscopy normal), inflammatory low back pain, nail changes. No hx of recurrent symmetrical small joint prolonged AM stiffness, dactylitis, tophi or SQ nodules. No tick bites. Has some prominent bony nodules over scattered DIPs that have formed slowly over time. Unclear etiology of her advanced b/l knee OA in her mid-30s, denies significant trauma, sports injuries, recurrent joint dislocations or hypermobile joints, elevated BMI when she was younger. No other joints with advanced OA issues, no FH of early age OA or hemochromatosis.      PAST MEDICAL & SURGICAL HISTORY:  Hypertension    Hyperlipidemia    Gastroesophageal reflux disease    History of total knee replacement, bilateral    H/O:   x4    History of partial hysterectomy    Review of Systems:  Gen:  No fevers/chills, weight loss  HEENT: No blurry vision, no difficulty swallowing  CVS: No chest pain/palpitations  Resp: No SOB/wheezing  GI: No N/V/C/D/abdominal pain  MSK: see HPI  Skin: No new rashes  Neuro: No headaches    MEDICATIONS  (STANDING):  amLODIPine   Tablet 10 milliGRAM(s) Oral daily  ATENolol  Tablet 50 milliGRAM(s) Oral daily  enoxaparin Injectable 40 milliGRAM(s) SubCutaneous daily  hydrochlorothiazide 25 milliGRAM(s) Oral daily  pantoprazole    Tablet 40 milliGRAM(s) Oral two times a day  senna 2 Tablet(s) Oral at bedtime    MEDICATIONS  (PRN):  acetaminophen   Tablet .. 650 milliGRAM(s) Oral every 6 hours PRN Temp greater or equal to 38C (100.4F), Mild Pain (1 - 3)  oxycodone    5 mG/acetaminophen 325 mG 1 Tablet(s) Oral every 6 hours PRN Severe Pain (7 - 10)  traMADol 50 milliGRAM(s) Oral every 6 hours PRN Moderate Pain (4 - 6)      Allergies    penicillin (Rash (Mild))    Intolerances    PERTINENT MEDICATION HISTORY: per med rec   FAMILY HISTORY:  FH: thyroid disease (Mother, Sibling)    Vital Signs Last 24 Hrs  T(C): 36.6 (2021 06:00), Max: 36.6 (2021 06:00)  T(F): 97.8 (2021 06:00), Max: 97.8 (2021 06:00)  HR: 61 (2021 06:00) (57 - 64)  BP: 133/77 (2021 06:00) (130/82 - 136/84  RR: 20 (2021 06:00) (17 - 20)  SpO2: 96% (2021 06:00) (95% - 98%)    Physical Exam:  General: No apparent distress, AAOx 3  HEENT: EOMI, MMM, no oral ulcers   CVS: +S1/S2, RRR  Resp: CTA b/l  GI: Soft, NT/ND  MSK: no synovitis, tophi, SQ nodules, ROM intact diffusely. + bony nodules over scattered DIPs, most prominent over R 4th. Well healed scar from bunion sx over R 1st toe.  Skin: no  rashes    LABS:                        15.6   6.94  )-----------( 292      ( 2021 05:19 )             47.4     -    139  |  101  |  21  ----------------------------<  82  4.5   |  35<H>  |  0.97    Ca    9.2      2021 05:19  Phos  3.2       Mg     2.1         TPro  6.6  /  Alb  3.2<L>  /  TBili  0.2  /  DBili  x   /  AST  16  /  ALT  30  /  AlkPhos  76            RADIOLOGY & ADDITIONAL STUDIES: GEOVANNY KIM  [id number]    HISTORY OF PRESENT ILLNESS: 51F hx of HTN, HLD, s/p bl TKR  pw L ear pain of about 2 days duration and had seen Urgent care 2 days ago and prescribed Biaxin 500mg bid and had 3 doses and ofloxacin ear drops was added yesterday and later developed acute onset of bl knee pain with swelling and unable to bend the knee secondary to significant pain and swelling. Denied any other peripheral joint involvement or axial sxs. Denied trauma. Denied any fevers, chills, cough, recent diarrheal illness, oral ulcers, eye inflammation, dysuria. No hx of crystal arthropathies, uric acid nephrolithiasis, autoimmune arthritis, psoriasis. In ED afebrile P: 97 BP: 151/90 sat 95-98% on RA. WBC: 13.3 K: 3.4 ESR Pending. s/p Decadron, ketorolac, Percocet in ED with still sig pain and unable to ambulate.     Rheumatology consulted for possible inflammatory arthritis as this is the second episode of acute onset joint pain. Reports she was traveling during memorial day weekend, acutely developed b/l foot and ankle pain and swelling, able to bear weight but very painful, seen in UC where only K was a little low, discharged without any meds and symptoms self-resolved within a few days. Then had a L sided tooth infection complicated by an abscess successfully treated with Abx a few weeks prior to her above ear pain and knee presentation. Reports knee pain/swelling developed acutely over the course of a few hours while she was sleeping, intensely painful, unable to bear weight or tolerate sheet on her skin. Took her 4 days in the hospital until she felt back to baseline, currently no issues with bending the knees or ambulating. No other active joint pain at present. Denies prior hx of knee, ankle, toe swelling. No hx of recurrent joint effusions or issues with her prostheses. No FH of crystalline arthritis, no change to meds or her diet prior to onset of these sx. No hx of psoriasis, inflammatory eye disease, IBD (recent colonoscopy normal), inflammatory low back pain, nail changes. No hx of recurrent symmetrical small joint prolonged AM stiffness, dactylitis, tophi or SQ nodules. No tick bites. Has some prominent bony nodules over scattered DIPs that have formed slowly over time. Unclear etiology of her advanced b/l knee OA in her mid-30s, denies significant trauma, sports injuries, recurrent joint dislocations or hypermobile joints, elevated BMI when she was younger. No other joints with advanced OA issues, no FH of early age OA or hemochromatosis.      PAST MEDICAL & SURGICAL HISTORY:  Hypertension    Hyperlipidemia    Gastroesophageal reflux disease    History of total knee replacement, bilateral    H/O:   x4    History of partial hysterectomy    Review of Systems:  Gen:  No fevers/chills, weight loss  HEENT: No blurry vision, no difficulty swallowing  CVS: No chest pain/palpitations  Resp: No SOB/wheezing  GI: No N/V/C/D/abdominal pain  MSK: see HPI  Skin: No new rashes  Neuro: No headaches    MEDICATIONS  (STANDING):  amLODIPine   Tablet 10 milliGRAM(s) Oral daily  ATENolol  Tablet 50 milliGRAM(s) Oral daily  enoxaparin Injectable 40 milliGRAM(s) SubCutaneous daily  hydrochlorothiazide 25 milliGRAM(s) Oral daily  pantoprazole    Tablet 40 milliGRAM(s) Oral two times a day  senna 2 Tablet(s) Oral at bedtime    MEDICATIONS  (PRN):  acetaminophen   Tablet .. 650 milliGRAM(s) Oral every 6 hours PRN Temp greater or equal to 38C (100.4F), Mild Pain (1 - 3)  oxycodone    5 mG/acetaminophen 325 mG 1 Tablet(s) Oral every 6 hours PRN Severe Pain (7 - 10)  traMADol 50 milliGRAM(s) Oral every 6 hours PRN Moderate Pain (4 - 6)      Allergies    penicillin (Rash (Mild))    Intolerances    PERTINENT MEDICATION HISTORY: per med rec   FAMILY HISTORY:  FH: thyroid disease (Mother, Sibling)    Vital Signs Last 24 Hrs  T(C): 36.6 (2021 06:00), Max: 36.6 (2021 06:00)  T(F): 97.8 (2021 06:00), Max: 97.8 (2021 06:00)  HR: 61 (2021 06:00) (57 - 64)  BP: 133/77 (2021 06:00) (130/82 - 136/84  RR: 20 (2021 06:00) (17 - 20)  SpO2: 96% (2021 06:00) (95% - 98%)    Physical Exam:  General: No apparent distress, AAOx 3  HEENT: EOMI, MMM, no oral ulcers   CVS: +S1/S2, RRR  Resp: CTA b/l  GI: Soft, NT/ND  MSK: no synovitis, tophi, SQ nodules, ROM intact diffusely. + bony nodules over scattered DIPs, most prominent over R 4th. Well healed scar from bunion sx over R 1st toe.  Skin: no  rashes    LABS:                        15.6   6.94  )-----------( 292      ( 2021 05:19 )             47.4         139  |  101  |  21  ----------------------------<  82  4.5   |  35<H>  |  0.97    Ca    9.2      2021 05:19  Phos  3.2       Mg     2.1         TPro  6.6  /  Alb  3.2<L>  /  TBili  0.2  /  DBili  x   /  AST  16  /  ALT  30  /  AlkPhos  76      Sedimentation Rate, Erythrocyte: 15 mm/hr (21 @ 04:30)       Historical Values  Sedimentation Rate, Erythrocyte: 16 mm/hr (21 @ 06:30)   Sedimentation Rate, Erythrocyte: 15 mm/hr (21 @ 04:30) C-Reactive Protein, Serum (21 @ 06:30)   C-Reactive Protein, Serum: 10 mg/L       Historical Values  C-Reactive Protein, Serum (21 @ 06:30)   C-Reactive Protein, Serum: 10 mg/L   C-Reactive Protein, Serum (21 @ 19:45)   C-Reactive Protein, Serum: 52 mg/L Uric Acid, Serum (21 @ 06:27)   Uric Acid, Serum: 4.5 mg/dL Rheumatoid Factor Quant, Serum or Plasma in AM (21 @ 06:30)   Rheumatoid Factor Quant, Serum or Plasma: 10 IU/mL Crystals, Synovial Fluid (21 @ 17:00)   Synovial Crystals Clarity: Turbid   Synovial Crystals Tube: Other: CUP   Synovial Crystals Color: Yellow   Synovial Crystals Id: None Seen Culture - Blood (21 @ 15:15)   Specimen Source: .Blood Blood-Venous   Culture Results:   No growth to date.     Culture - Body Fluid with Gram Stain (21 @ 17:00)   Gram Stain:   polymorphonuclear leukocytes seen   No organisms seen   by cytocentrifuge   Specimen Source: .Body Fluid Knee Fluid   Culture Results:   No growth Cell Count, Body Fluid (21 @ 17:00)   Eosinophil Count - Body Fluid: 0 %   Other Body Cells: 0 %   Mesothelial Cells - Body Fluid: 0 %   Monocyte/Macrophage Count - Body Fluid: 22 %   Fluid Segmented Granulocytes: 76 %   Fluid Type: Synovial Fluid   Body Fluid Appearance: Turbid   BF Lymphocytes: 2 %   Atypical Lymphocytes - Body Fluid: 0 %   Nucleated Red Blood Cells - Body Fluid: 0   Tube Type: Sterile   Color - Body Fluid: Yellow   Total Nucleated Cell Count, Body Fluid: [id number]: Peritoneal/Pleural/ Pericardial Body Fluid Types   Total Nucleated cells: <500/uL   Neutrophils: <25%   Synovial Body Fluid Type   Total Nucleated cells: <150/uL   Neutrophils: <25%   Lymphocytes: <75%   Moncytes/Macrophages:   Total RBC Count: 65327 /uL   RADIOLOGY & ADDITIONAL STUDIES:    < from: Xray Knee 3 Views, Bilateral (21 @ 03:09) >  EXAM:  XR KNEE AP LAT OBL 3 VIEWS BI      PROCEDURE DATE:  2021        INTERPRETATION:  CLINICAL HISTORY:Bilateral knee pain.    TECHNIQUE: Bilateral AP, lateral, and oblique radiographs    FINDINGS: Bilateral knee tricompartmental prostheticcomponents intact without fracture or dislocation.  However there is a moderate to-large RIGHT suprapatellar joint effusion.    IMPRESSION:  No fracture or dislocation.  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  MODERATE-LARGE RIGHT SUPRAPATELLAR JOINT EFFUSION.    --- End of Report ---        ISAIAS SHAW MD; Attending Radiologist  This document has been electronically signed. 2021  8:36PM    < end of copied text >

## 2021-07-28 LAB
CCP IGG SERPL-ACNC: <8 UNITS — SIGNIFICANT CHANGE UP
RF+CCP IGG SER-IMP: NEGATIVE — SIGNIFICANT CHANGE UP

## 2021-07-29 LAB
CULTURE RESULTS: SIGNIFICANT CHANGE UP
CULTURE RESULTS: SIGNIFICANT CHANGE UP
SPECIMEN SOURCE: SIGNIFICANT CHANGE UP
SPECIMEN SOURCE: SIGNIFICANT CHANGE UP

## 2021-08-04 PROBLEM — K21.9 GASTRO-ESOPHAGEAL REFLUX DISEASE WITHOUT ESOPHAGITIS: Chronic | Status: ACTIVE | Noted: 2021-07-23

## 2021-08-04 PROBLEM — I10 ESSENTIAL (PRIMARY) HYPERTENSION: Chronic | Status: ACTIVE | Noted: 2021-07-23

## 2021-08-04 PROBLEM — E78.5 HYPERLIPIDEMIA, UNSPECIFIED: Chronic | Status: ACTIVE | Noted: 2021-07-23

## 2021-08-06 LAB
CULTURE RESULTS: SIGNIFICANT CHANGE UP
SPECIMEN SOURCE: SIGNIFICANT CHANGE UP

## 2021-08-31 ENCOUNTER — APPOINTMENT (OUTPATIENT)
Dept: RHEUMATOLOGY | Facility: CLINIC | Age: 51
End: 2021-08-31
Payer: COMMERCIAL

## 2021-08-31 ENCOUNTER — NON-APPOINTMENT (OUTPATIENT)
Age: 51
End: 2021-08-31

## 2021-08-31 VITALS
HEIGHT: 65.5 IN | BODY MASS INDEX: 36.05 KG/M2 | HEART RATE: 78 BPM | WEIGHT: 219 LBS | SYSTOLIC BLOOD PRESSURE: 122 MMHG | DIASTOLIC BLOOD PRESSURE: 70 MMHG | OXYGEN SATURATION: 94 % | RESPIRATION RATE: 15 BRPM | TEMPERATURE: 97.3 F

## 2021-08-31 DIAGNOSIS — M17.0 BILATERAL PRIMARY OSTEOARTHRITIS OF KNEE: ICD-10-CM

## 2021-08-31 DIAGNOSIS — Z78.9 OTHER SPECIFIED HEALTH STATUS: ICD-10-CM

## 2021-08-31 DIAGNOSIS — M15.4 EROSIVE (OSTEO)ARTHRITIS: ICD-10-CM

## 2021-08-31 DIAGNOSIS — M25.462 EFFUSION, RIGHT KNEE: ICD-10-CM

## 2021-08-31 DIAGNOSIS — M25.461 EFFUSION, RIGHT KNEE: ICD-10-CM

## 2021-08-31 PROCEDURE — 99214 OFFICE O/P EST MOD 30 MIN: CPT

## 2021-08-31 NOTE — PHYSICAL EXAM
[General Appearance - Alert] : alert [General Appearance - In No Acute Distress] : in no acute distress [General Appearance - Well Nourished] : well nourished [Sclera] : the sclera and conjunctiva were normal [PERRL With Normal Accommodation] : pupils were equal in size, round, and reactive to light [Extraocular Movements] : extraocular movements were intact [Oropharynx] : the oropharynx was normal [Neck Appearance] : the appearance of the neck was normal [Auscultation Breath Sounds / Voice Sounds] : lungs were clear to auscultation bilaterally [Heart Rate And Rhythm] : heart rate was normal and rhythm regular [Heart Sounds] : normal S1 and S2 [Edema] : there was no peripheral edema [Bowel Sounds] : normal bowel sounds [Abdomen Soft] : soft [Abdomen Tenderness] : non-tender [No CVA Tenderness] : no ~M costovertebral angle tenderness [No Spinal Tenderness] : no spinal tenderness [Abnormal Walk] : normal gait [Nail Clubbing] : no clubbing  or cyanosis of the fingernails [Musculoskeletal - Swelling] : no joint swelling seen [Motor Tone] : muscle strength and tone were normal [FreeTextEntry1] : No synovitis, effusions, contractures. ROM diffusely intact.  Bony nodules over b/l hands, worst over R 4th  [] : no rash [Motor Exam] : the motor exam was normal [No Focal Deficits] : no focal deficits [Oriented To Time, Place, And Person] : oriented to person, place, and time [Impaired Insight] : insight and judgment were intact [Affect] : the affect was normal

## 2021-08-31 NOTE — ASSESSMENT
[FreeTextEntry1] : GEOVANNY KIM is a 51 year old woman with PMH significant for bl TKR 2006 due to unclear etiology of advanced degenerative arthritis at an early age without evidence of other advanced OA\par changes, here for hospital f/u for acute onset b/l knee severe pain/swelling + effusion, and inability to bear weight. S/p R knee arthrocentesis with 46K WBCs, no crystals, gram stain/cx/BCx negative but in setting of + ABx and with 2 prior ?infections -- tooth followed by ear. Imaging with prostheses in place. Full resolution\par approx 4 days after admission, similar sx in b/l ankles and feet a few months ago, self resolved without treatment but no episodes prior to that. Sx have not recurred since discharge, serological w/u was negative, and XR of fingers with some erosive OA changes in setting of bony changes over DIPs. \par \par - reviewed labs and imaging in detail with patient, all questions answered, low suspicion for inflammatory arthritis at present\par - will trial conservative measures for hand OA -- moist heat, paraffin wax baths, topical voltaren gel, prn tylenol, massage\par - reviewed home exercises to maintain strength and ROM in her hands\par - if above not effective or further synovitis then RTC prn

## 2021-08-31 NOTE — HISTORY OF PRESENT ILLNESS
[FreeTextEntry1] : GEOVANNY KIM is a 51 year old woman here for hospital f/u - PMH of HTN, HLD, s/p bl TKR 2006 p/w L ear pain of about 2 days duration and had seen Urgent care 2 days ago and prescribed Biaxin 500mg bid and had 3 doses and ofloxacin ear drops was added yesterday and later developed acute onset of bl knee pain with swelling and unable to bend the knee secondary to significant pain and swelling. Denied any other peripheral joint involvement or axial sxs. Denied trauma. Denied any fevers, chills, cough, recent diarrheal illness, oral ulcers, eye inflammation, dysuria. No hx of crystal arthropathies, uric acid nephrolithiasis, autoimmune arthritis, psoriasis. In ED afebrile P: 97 BP: 151/90 sat 95-98% on RA. WBC: 13.3 K: 3.4 ESR Pending. s/p Decadron, ketorolac, Percocet in ED with still sig pain and unable to ambulate.\par \par Rheumatology consulted for possible inflammatory arthritis as this is the second episode of acute onset joint pain. Reports she was traveling during memorial day weekend, acutely developed b/l foot and ankle pain and swelling,\par able to bear weight but very painful, seen in UC where only K was a little low, discharged without any meds and symptoms self-resolved within a few days. Then had a L sided tooth infection complicated by an abscess successfully treated with Abx a few weeks prior to her above ear pain and knee presentation. Reports knee pain/swelling developed acutely over the course of a few hours while she was sleeping, intensely painful, unable to bear weight or tolerate sheet on her skin. Took her 4 days in the hospital until she felt back to baseline, currently no issues with bending the knees or ambulating. No other active joint pain at present. Denies prior hx of knee, ankle, toe swelling. No hx of recurrent joint effusions or issues with her prostheses. No FH of crystalline arthritis, no change to meds or her diet prior to onset of these sx. No hx of psoriasis, inflammatory eye disease, IBD (recent colonoscopy normal), inflammatory low back pain, nail changes. No hx of recurrent symmetrical small\par joint prolonged AM stiffness, dactylitis, tophi or SQ nodules. No tick bites. Has some prominent bony nodules over scattered DIPs that have formed slowly over time. Unclear etiology of her advanced b/l knee OA in her mid-30s, denies significant trauma, sports injuries, recurrent joint dislocations or hypermobile joints, elevated BMI when she was younger. No other joints with advanced OA issues, no FH of early age OA or hemochromatosis.\par \par XR hands - erosive OA \par XR feet - prior sx, OA changes \par Labs - CRP elevated\par Negative - ESR, RF/CCP, sUA, synovial fluid with 46K WBC and no crystals or infection \par \par ----------\par 8/31/21 -- Presents for follow-up. No recurrent episodes of knee pain/swelling since discharge, no other inflammatory joint sx. Ongoing pain over DIPs with gradual enlargement of bony nodules. No rashes, no extra-articular inflammatory sx, feels well otherwise, never had to use the steroid taper.

## 2021-08-31 NOTE — DATA REVIEWED
[FreeTextEntry1] : Available notes, and pertinent labs & imaging in [facility] reviewed. Pertinent labs and imaging summarized in HPI.

## 2022-04-13 ENCOUNTER — NON-APPOINTMENT (OUTPATIENT)
Age: 52
End: 2022-04-13

## 2022-06-20 ENCOUNTER — RESULT REVIEW (OUTPATIENT)
Age: 52
End: 2022-06-20

## 2024-08-29 NOTE — PATIENT PROFILE ADULT - CAREGIVER NAME
Problem: Adult Inpatient Plan of Care  Goal: Absence of Hospital-Acquired Illness or Injury  Intervention: Identify and Manage Fall Risk  Recent Flowsheet Documentation  Taken 8/29/2024 0815 by Arron Prince RN  Safety Promotion/Fall Prevention:   nonskid shoes/slippers when out of bed   patient and family education   assistive device/personal items within reach   safety round/check completed     Problem: Adult Inpatient Plan of Care  Goal: Optimal Comfort and Wellbeing  Intervention: Monitor Pain and Promote Comfort  Recent Flowsheet Documentation  Taken 8/29/2024 0812 by Arron Prince RN  Pain Management Interventions:   medication (see MAR)   rest   Goal Outcome Evaluation:      Plan of Care Reviewed With: patient    Overall Patient Progress: improving    SUBJECTIVE:  Ready to discharge and/or transiton care. Pt A/O with VSS. Went over education and AVS. Belongings remain with patient. Transportation provided by Family.     Arron Jean RN      
Goal Outcome Evaluation:      Plan of Care Reviewed With: patient    Overall Patient Progress: no change      Patient is alert and orientated x 4.  Independent in room.  C/O ABD pain RUQ- 7-8/10.  Receiving IV dilaudid PRN with fair to good relief.  Tolerated clear liquids.  Loose stools x 2.  Stool sample sent per orders.  Declined SCD's at this time.  
Patient admitted to room 16 at approximately 0915 via cart from emergency room.  Reason for Admission: Abdominal pain RUQ, N/V.  Report received from: note in chart  Patient was accompanied by transport.  Patient ambulated/transferred:  independently. self.  Patient is alert and orientated x 4.  Outpatient Observation education provided to: (patient, family, friend)  MDRO Education done if applicable (MRSA, VRE, etc)  Safety risks were identified during admission:  none.   Yellow risk/fall band applied:  No  Home meds sent home: No  Home meds sent to pharmacy:No   Detailed Belongings: purse, cell phone, , computer.    
Problem: Adult Inpatient Plan of Care  Goal: Optimal Comfort and Wellbeing  Outcome: Progressing  Intervention: Monitor Pain and Promote Comfort  Recent Flowsheet Documentation  Taken 8/28/2024 2340 by Citlali Patton RN  Pain Management Interventions:   medication (see MAR)   rest  Taken 8/28/2024 2033 by Citlali Patton RN  Pain Management Interventions: medication (see MAR)     Problem: Nausea and Vomiting  Goal: Nausea and Vomiting Relief  Outcome: Progressing     Problem: Pain Acute  Goal: Optimal Pain Control and Function  Outcome: Progressing  Intervention: Develop Pain Management Plan  Recent Flowsheet Documentation  Taken 8/28/2024 2340 by Citlali Patton RN  Pain Management Interventions:   medication (see MAR)   rest  Taken 8/28/2024 2033 by Citlali Patton RN  Pain Management Interventions: medication (see MAR)  Intervention: Prevent or Manage Pain  Recent Flowsheet Documentation  Taken 8/28/2024 2340 by Citlali Patton RN  Medication Review/Management: medications reviewed  Taken 8/28/2024 2033 by Citlali Patton RN  Medication Review/Management: medications reviewed     Goal Outcome Evaluation:         Pt C/O 7/10 abdominal pain. PRN IV Dilaudid given with help. Pt requested to advance to full liquids. Had a cup of pudding and a cup of apple sauce, tolerated well. Denies nausea. C.Diff negative.                 
Jefry Mattson